# Patient Record
Sex: FEMALE | Race: ASIAN | Employment: UNEMPLOYED | URBAN - METROPOLITAN AREA
[De-identification: names, ages, dates, MRNs, and addresses within clinical notes are randomized per-mention and may not be internally consistent; named-entity substitution may affect disease eponyms.]

---

## 2017-03-07 ENCOUNTER — OFFICE VISIT (OUTPATIENT)
Dept: FAMILY MEDICINE CLINIC | Age: 37
End: 2017-03-07

## 2017-03-07 ENCOUNTER — TELEPHONE (OUTPATIENT)
Dept: FAMILY MEDICINE CLINIC | Age: 37
End: 2017-03-07

## 2017-03-07 VITALS
SYSTOLIC BLOOD PRESSURE: 112 MMHG | DIASTOLIC BLOOD PRESSURE: 77 MMHG | WEIGHT: 121 LBS | HEART RATE: 80 BPM | TEMPERATURE: 98 F | OXYGEN SATURATION: 99 % | RESPIRATION RATE: 18 BRPM | HEIGHT: 64 IN | BODY MASS INDEX: 20.66 KG/M2

## 2017-03-07 DIAGNOSIS — E78.00 ELEVATED CHOLESTEROL: Primary | ICD-10-CM

## 2017-03-07 DIAGNOSIS — Z00.00 ROUTINE GENERAL MEDICAL EXAMINATION AT A HEALTH CARE FACILITY: ICD-10-CM

## 2017-03-07 NOTE — MR AVS SNAPSHOT
Visit Information Date & Time Provider Department Dept. Phone Encounter #  
 3/7/2017  8:00 AM Nikko Pacheco MD [address] [facility] [phone] [id number] Upcoming Health Maintenance Date Due  
 PAP AKA CERVICAL CYTOLOGY 6/1/2019 DTaP/Tdap/Td series (2 - Td) 3/1/2021 Allergies as of 3/7/2017  Review Complete On: 3/7/2017 By: Nikko Pacheco MD  
 No Known Allergies Current Immunizations  Reviewed on 3/7/2017 Name Date TDAP Vaccine 3/1/2011 Reviewed by Nikko Pacheco MD on 3/7/2017 at  8:31 AM  
You Were Diagnosed With   
  
 Codes Comments Elevated cholesterol    -  Primary ICD-10-CM: E78.00 ICD-9-CM: 272.0 Routine general medical examination at a health care facility     ICD-10-CM: Z00.00 ICD-9-CM: V70.0 Vitals BP Pulse Temp Resp Height(growth percentile) Weight(growth percentile) 112/77 (BP 1 Location: Left arm, BP Patient Position: Sitting) 80 98 °F (36.7 °C) (Oral) 18 5' 4\" (1.626 m) 121 lb (54.9 kg) LMP SpO2 BMI OB Status Smoking Status 03/05/2017 (Exact Date) 99% 20.77 kg/m2 Having regular periods Never Smoker Vitals History BMI and BSA Data Body Mass Index Body Surface Area 20.77 kg/m 2 1.57 m 2 Preferred Pharmacy Pharmacy Name Phone [id number] [phone] Your Updated Medication List  
  
   
This list is accurate as of: 3/7/17  8:32 AM.  Always use your most recent med list.  
  
  
  
  
 loratadine 10 mg tablet Commonly known as:  CLARITIN  
TAKE 1 TABLET BY MOUTH EVERY DAY  
  
 multivitamin tablet Commonly known as:  ONE A DAY Take 1 Tab by mouth daily. [address] [facility] (28) 0.25-35 mg-mcg Tab Generic drug:  norgestimate-ethinyl estradiol We Performed the Following CBC WITH AUTOMATED DIFF [03104 CPT(R)] HEMOGLOBIN A1C WITH EAG [26235 CPT(R)] LIPID PANEL [12022 CPT(R)] METABOLIC PANEL, COMPREHENSIVE [17057 CPT(R)] TSH 3RD GENERATION [19654 CPT(R)] VITAMIN D, 25 HYDROXY [id number] CPT(R)] Introducing \[facility] [place] [facility]\"" & HEALTH SERVICES! Spring Peter introduces [employer] patient portal. Now you can access parts of your medical record, email your doctor's office, and request medication refills online. 1. In your internet browser, go to [email or web address] 2. Click on the First Time User? Click Here link in the Sign In box. You will see the New Member Sign Up page. 3. Enter your [employer] Access Code exactly as it appears below. You will not need to use this code after youve completed the sign-up process. If you do not sign up before the expiration date, you must request a new code. · [employer] Access Code: [email or web address] Expires: 6/5/2017  8:32 AM 
 
4. Enter the last four digits of your Social Security Number (xxxx) and Date of Birth (mm/dd/yyyy) as indicated and click Submit. You will be taken to the next sign-up page. 5. Create a [employer] ID. This will be your [employer] login ID and cannot be changed, so think of one that is secure and easy to remember. 6. Create a [employer] password. You can change your password at any time. 7. Enter your Password Reset Question and Answer. This can be used at a later time if you forget your password. 8. Enter your e-mail address. You will receive e-mail notification when new information is available in [address] 9. Click Sign Up. You can now view and download portions of your medical record. 10. Click the Download Summary menu link to download a portable copy of your medical information. If you have questions, please visit the Frequently Asked Questions section of the [employer] website. Remember, [employer] is NOT to be used for urgent needs. For medical emergencies, dial 911. Now available from your iPhone and Android! Please provide this summary of care documentation to your next provider. Your primary care clinician is listed as MIKAL VALENCIA. If you have any questions after today's visit, please call [phone].

## 2017-03-07 NOTE — TELEPHONE ENCOUNTER
DOS 3-7-17     Per general billing guidelines, procedure code 76123 indicates that a preventive service was rendered. However, the primary diagnosis  may not support this type of service.    Thank you

## 2017-03-07 NOTE — PROGRESS NOTES
Pt here for routine fasting lab work. Denies having any concerns at this time. Pt reports that she has been watching what she eats, has lost 8 pounds. Pt is also a runner, when the weather allows. Subjective: (As above and below)   No chief complaint on file. she is a 39y.o. year old female who presents for evaluation. Reviewed PmHx, RxHx, FmHx, SocHx, AllgHx and updated in chart. Review of Systems - negative except as listed above    Objective:     Vitals:    03/07/17 0815   BP: 112/77   Pulse: 80   Resp: 18   Temp: 98 °F (36.7 °C)   TempSrc: Oral   SpO2: 99%   Weight: 121 lb (54.9 kg)   Height: 5' 4\" (1.626 m)     Physical Examination: General appearance - alert, well appearing, and in no distress  Mental status - normal mood, behavior, speech, dress, motor activity, and thought processes  Eyes - pupils equal and reactive, extraocular eye movements intact  Mouth - mucous membranes moist, pharynx normal without lesions  Chest - clear to auscultation, no wheezes, rales or rhonchi, symmetric air entry  Heart - normal rate, regular rhythm, normal S1, S2, no murmurs, rubs, clicks or gallops  Musculoskeletal - no joint tenderness, deformity or swelling  Extremities - peripheral pulses normal, no pedal edema, no clubbing or cyanosis    Assessment/ Plan:   1. Elevated cholesterol  -check fasting labs  - CBC WITH AUTOMATED DIFF  - LIPID PANEL  - METABOLIC PANEL, COMPREHENSIVE  - VITAMIN D, 25 HYDROXY  - TSH 3RD GENERATION  - HEMOGLOBIN A1C WITH EAG    2. Routine general medical examination at a health care facility  -doing well, continue regular exercise     Follow-up Disposition: As needed  I have discussed the diagnosis with the patient and the intended plan as seen in the above orders. The patient has received an after-visit summary and questions were answered concerning future plans.      Medication Side Effects and Warnings were discussed with patient: yes  Patient Labs were reviewed: yes  Patient Past Records were reviewed:  yes    Rosa Fletcher M.D.

## 2017-03-08 LAB
25(OH)D3+25(OH)D2 SERPL-MCNC: 36 NG/ML (ref 30–100)
ALBUMIN SERPL-MCNC: 4.9 G/DL (ref 3.5–5.5)
ALBUMIN/GLOB SERPL: 2.1 {RATIO} (ref 1.1–2.5)
ALP SERPL-CCNC: 50 IU/L (ref 39–117)
ALT SERPL-CCNC: 13 IU/L (ref 0–32)
AST SERPL-CCNC: 12 IU/L (ref 0–40)
BASOPHILS # BLD AUTO: 0 X10E3/UL (ref 0–0.2)
BASOPHILS NFR BLD AUTO: 0 %
BILIRUB SERPL-MCNC: 0.5 MG/DL (ref 0–1.2)
BUN SERPL-MCNC: 16 MG/DL (ref 6–20)
BUN/CREAT SERPL: 28 (ref 8–20)
CALCIUM SERPL-MCNC: 9.5 MG/DL (ref 8.7–10.2)
CHLORIDE SERPL-SCNC: 102 MMOL/L (ref 96–106)
CHOLEST SERPL-MCNC: 176 MG/DL (ref 100–199)
CO2 SERPL-SCNC: 23 MMOL/L (ref 18–29)
CREAT SERPL-MCNC: 0.58 MG/DL (ref 0.57–1)
EOSINOPHIL # BLD AUTO: 0.1 X10E3/UL (ref 0–0.4)
EOSINOPHIL NFR BLD AUTO: 2 %
ERYTHROCYTE [DISTWIDTH] IN BLOOD BY AUTOMATED COUNT: 13.8 % (ref 12.3–15.4)
EST. AVERAGE GLUCOSE BLD GHB EST-MCNC: 123 MG/DL
GLOBULIN SER CALC-MCNC: 2.3 G/DL (ref 1.5–4.5)
GLUCOSE SERPL-MCNC: 94 MG/DL (ref 65–99)
HBA1C MFR BLD: 5.9 % (ref 4.8–5.6)
HCT VFR BLD AUTO: 38.4 % (ref 34–46.6)
HDLC SERPL-MCNC: 60 MG/DL
HGB BLD-MCNC: 12.8 G/DL (ref 11.1–15.9)
IMM GRANULOCYTES # BLD: 0 X10E3/UL (ref 0–0.1)
IMM GRANULOCYTES NFR BLD: 0 %
INTERPRETATION, 910389: NORMAL
LDLC SERPL CALC-MCNC: 99 MG/DL (ref 0–99)
LYMPHOCYTES # BLD AUTO: 1.8 X10E3/UL (ref 0.7–3.1)
LYMPHOCYTES NFR BLD AUTO: 39 %
MCH RBC QN AUTO: 29.2 PG (ref 26.6–33)
MCHC RBC AUTO-ENTMCNC: 33.3 G/DL (ref 31.5–35.7)
MCV RBC AUTO: 88 FL (ref 79–97)
MONOCYTES # BLD AUTO: 0.3 X10E3/UL (ref 0.1–0.9)
MONOCYTES NFR BLD AUTO: 7 %
NEUTROPHILS # BLD AUTO: 2.5 X10E3/UL (ref 1.4–7)
NEUTROPHILS NFR BLD AUTO: 52 %
PLATELET # BLD AUTO: 220 X10E3/UL (ref 150–379)
POTASSIUM SERPL-SCNC: 4.7 MMOL/L (ref 3.5–5.2)
PROT SERPL-MCNC: 7.2 G/DL (ref 6–8.5)
RBC # BLD AUTO: 4.38 X10E6/UL (ref 3.77–5.28)
SODIUM SERPL-SCNC: 143 MMOL/L (ref 134–144)
TRIGL SERPL-MCNC: 85 MG/DL (ref 0–149)
TSH SERPL DL<=0.005 MIU/L-ACNC: 1.48 UIU/ML (ref 0.45–4.5)
VLDLC SERPL CALC-MCNC: 17 MG/DL (ref 5–40)
WBC # BLD AUTO: 4.7 X10E3/UL (ref 3.4–10.8)

## 2017-03-08 NOTE — PROGRESS NOTES
Increase in risk for diabetes, please closely monitor diet and increase exercise   All other labs are within normal limits.    Please inform

## 2017-05-30 RX ORDER — LORATADINE 10 MG/1
TABLET ORAL
Qty: 30 TAB | Refills: 3 | Status: SHIPPED | OUTPATIENT
Start: 2017-05-30 | End: 2019-08-20 | Stop reason: ALTCHOICE

## 2018-11-13 ENCOUNTER — OFFICE VISIT (OUTPATIENT)
Dept: FAMILY MEDICINE CLINIC | Age: 38
End: 2018-11-13

## 2018-11-13 VITALS
HEIGHT: 64 IN | TEMPERATURE: 98.1 F | BODY MASS INDEX: 20.38 KG/M2 | SYSTOLIC BLOOD PRESSURE: 101 MMHG | OXYGEN SATURATION: 98 % | HEART RATE: 86 BPM | DIASTOLIC BLOOD PRESSURE: 71 MMHG | WEIGHT: 119.4 LBS | RESPIRATION RATE: 17 BRPM

## 2018-11-13 DIAGNOSIS — E78.00 ELEVATED CHOLESTEROL: ICD-10-CM

## 2018-11-13 DIAGNOSIS — Z11.1 SCREENING-PULMONARY TB: Primary | ICD-10-CM

## 2018-11-13 DIAGNOSIS — Z00.00 ROUTINE GENERAL MEDICAL EXAMINATION AT A HEALTH CARE FACILITY: ICD-10-CM

## 2018-11-13 NOTE — PROGRESS NOTES
Chief Complaint   Patient presents with    Physical    Labs     Pt is fasting     Pt seen in the office today for a physical with fasting labs     Pt reports that she has been having night sweats, several nights a week. Pt is on birth control pills, reports that her periods are lighter. Pt reports occasional episodes of vertigo in her sleep, pt reports that she does not have symptoms while she is up and moving. Pt also reports some mood swings and low sex drive. Pt reports that she is under a lot of stress, 3 children. Subjective:   40 y.o. female for Well Woman Check. Her gyne and breast care is done elsewhere by her Ob-Gyne physician. Patient Active Problem List   Diagnosis Code    Sciatica M54.30    Elevated cholesterol E78.00     Current Outpatient Medications   Medication Sig Dispense Refill    SPRINTEC, 28, 0.25-35 mg-mcg per tablet       multivitamin (ONE A DAY) tablet Take 1 Tab by mouth daily.  loratadine (CLARITIN) 10 mg tablet TAKE 1 TABLET BY MOUTH EVERY DAY 30 Tab 3     No Known Allergies     Lab Results   Component Value Date/Time    Cholesterol, total 176 03/07/2017 08:38 AM    HDL Cholesterol 60 03/07/2017 08:38 AM    LDL, calculated 99 03/07/2017 08:38 AM    Triglyceride 85 03/07/2017 08:38 AM        ROS: Feeling generally well. No TIA's or unusual headaches, no dysphagia. No prolonged cough. No dyspnea or chest pain on exertion. No abdominal pain, change in bowel habits, black or bloody stools. No urinary tract symptoms. No new or unusual musculoskeletal symptoms. Specific concerns today: See above. Objective: The patient appears well, alert, oriented x 3, in no distress. Visit Vitals  /71 (BP 1 Location: Left arm, BP Patient Position: Sitting)   Pulse 86   Temp 98.1 °F (36.7 °C) (Oral)   Resp 17   Ht 5' 4\" (1.626 m)   Wt 119 lb 6.4 oz (54.2 kg)   LMP 11/12/2018   SpO2 98%   BMI 20.49 kg/m²     ENT normal.  Neck supple.  No adenopathy or thyromegaly. NIRAV. Lungs are clear, good air entry, no wheezes, rhonchi or rales. S1 and S2 normal, no murmurs, regular rate and rhythm. Abdomen soft without tenderness, guarding, mass or organomegaly. Extremities show no edema, normal peripheral pulses. Neurological is normal, no focal findings. Breast and Pelvic exams are deferred. Assessment/Plan:   Well Woman  routine labs ordered, call if any problems    ICD-10-CM ICD-9-CM    1. Screening-pulmonary TB Z11.1 V74.1 QUANTIFERON-TB GOLD PLUS   2. Routine general medical examination at a health care facility Z00.00 V70.0 CBC WITH AUTOMATED DIFF      HEMOGLOBIN A1C WITH EAG      TSH 3RD GENERATION      VITAMIN D, 25 HYDROXY   3. Elevated cholesterol C98.06 735.6 METABOLIC PANEL, COMPREHENSIVE      LIPID PANEL     Encounter Diagnoses   Name Primary?     Screening-pulmonary TB Yes    Routine general medical examination at a health care facility     Elevated cholesterol      Orders Placed This Encounter    QUANTIFERON-TB GOLD PLUS    METABOLIC PANEL, COMPREHENSIVE    CBC WITH AUTOMATED DIFF    LIPID PANEL    HEMOGLOBIN A1C WITH EAG    TSH 3RD GENERATION    VITAMIN D, 25 HYDROXY

## 2018-11-14 LAB
25(OH)D3+25(OH)D2 SERPL-MCNC: 35 NG/ML (ref 30–100)
ALBUMIN SERPL-MCNC: 4.6 G/DL (ref 3.5–5.5)
ALBUMIN/GLOB SERPL: 1.8 {RATIO} (ref 1.2–2.2)
ALP SERPL-CCNC: 53 IU/L (ref 39–117)
ALT SERPL-CCNC: 11 IU/L (ref 0–32)
AST SERPL-CCNC: 14 IU/L (ref 0–40)
BASOPHILS # BLD AUTO: 0 X10E3/UL (ref 0–0.2)
BASOPHILS NFR BLD AUTO: 1 %
BILIRUB SERPL-MCNC: 0.5 MG/DL (ref 0–1.2)
BUN SERPL-MCNC: 16 MG/DL (ref 6–20)
BUN/CREAT SERPL: 24 (ref 9–23)
CALCIUM SERPL-MCNC: 9.7 MG/DL (ref 8.7–10.2)
CHLORIDE SERPL-SCNC: 101 MMOL/L (ref 96–106)
CHOLEST SERPL-MCNC: 178 MG/DL (ref 100–199)
CO2 SERPL-SCNC: 24 MMOL/L (ref 20–29)
CREAT SERPL-MCNC: 0.66 MG/DL (ref 0.57–1)
EOSINOPHIL # BLD AUTO: 0.1 X10E3/UL (ref 0–0.4)
EOSINOPHIL NFR BLD AUTO: 2 %
ERYTHROCYTE [DISTWIDTH] IN BLOOD BY AUTOMATED COUNT: 13.5 % (ref 12.3–15.4)
EST. AVERAGE GLUCOSE BLD GHB EST-MCNC: 117 MG/DL
GLOBULIN SER CALC-MCNC: 2.6 G/DL (ref 1.5–4.5)
GLUCOSE SERPL-MCNC: 89 MG/DL (ref 65–99)
HBA1C MFR BLD: 5.7 % (ref 4.8–5.6)
HCT VFR BLD AUTO: 43 % (ref 34–46.6)
HDLC SERPL-MCNC: 59 MG/DL
HGB BLD-MCNC: 13.8 G/DL (ref 11.1–15.9)
IMM GRANULOCYTES # BLD: 0 X10E3/UL (ref 0–0.1)
IMM GRANULOCYTES NFR BLD: 0 %
INTERPRETATION, 910389: NORMAL
LDLC SERPL CALC-MCNC: 97 MG/DL (ref 0–99)
LYMPHOCYTES # BLD AUTO: 1.8 X10E3/UL (ref 0.7–3.1)
LYMPHOCYTES NFR BLD AUTO: 47 %
MCH RBC QN AUTO: 28.8 PG (ref 26.6–33)
MCHC RBC AUTO-ENTMCNC: 32.1 G/DL (ref 31.5–35.7)
MCV RBC AUTO: 90 FL (ref 79–97)
MONOCYTES # BLD AUTO: 0.2 X10E3/UL (ref 0.1–0.9)
MONOCYTES NFR BLD AUTO: 6 %
NEUTROPHILS # BLD AUTO: 1.6 X10E3/UL (ref 1.4–7)
NEUTROPHILS NFR BLD AUTO: 44 %
PLATELET # BLD AUTO: 228 X10E3/UL (ref 150–379)
POTASSIUM SERPL-SCNC: 4.7 MMOL/L (ref 3.5–5.2)
PROT SERPL-MCNC: 7.2 G/DL (ref 6–8.5)
RBC # BLD AUTO: 4.8 X10E6/UL (ref 3.77–5.28)
SODIUM SERPL-SCNC: 140 MMOL/L (ref 134–144)
TRIGL SERPL-MCNC: 111 MG/DL (ref 0–149)
TSH SERPL DL<=0.005 MIU/L-ACNC: 1.34 UIU/ML (ref 0.45–4.5)
VLDLC SERPL CALC-MCNC: 22 MG/DL (ref 5–40)
WBC # BLD AUTO: 3.7 X10E3/UL (ref 3.4–10.8)

## 2018-11-14 NOTE — PROGRESS NOTES
Blood sugars have improved, remain very slightly elevated. All other labs are within normal limits. Please inform.

## 2018-11-16 LAB
GAMMA INTERFERON BACKGROUND BLD IA-ACNC: 0.02 IU/ML
M TB IFN-G BLD-IMP: NEGATIVE
M TB IFN-G CD4+ BCKGRND COR BLD-ACNC: 0.12 IU/ML
MITOGEN IGNF BLD-ACNC: >10 IU/ML
QUANTIFERON INCUBATION, QF1T: NORMAL
QUANTIFERON TB2 AG: 0.14 IU/ML
SERVICE CMNT-IMP: NORMAL

## 2019-08-20 ENCOUNTER — OFFICE VISIT (OUTPATIENT)
Dept: FAMILY MEDICINE CLINIC | Age: 39
End: 2019-08-20

## 2019-08-20 VITALS
RESPIRATION RATE: 14 BRPM | BODY MASS INDEX: 20.79 KG/M2 | HEIGHT: 64 IN | TEMPERATURE: 98.1 F | OXYGEN SATURATION: 98 % | WEIGHT: 121.8 LBS | SYSTOLIC BLOOD PRESSURE: 108 MMHG | HEART RATE: 64 BPM | DIASTOLIC BLOOD PRESSURE: 74 MMHG

## 2019-08-20 DIAGNOSIS — M54.32 BILATERAL SCIATICA: ICD-10-CM

## 2019-08-20 DIAGNOSIS — M54.31 BILATERAL SCIATICA: ICD-10-CM

## 2019-08-20 DIAGNOSIS — F41.9 ANXIETY: Primary | ICD-10-CM

## 2019-08-20 RX ORDER — DULOXETIN HYDROCHLORIDE 30 MG/1
30 CAPSULE, DELAYED RELEASE ORAL DAILY
Qty: 30 CAP | Refills: 5 | Status: SHIPPED | OUTPATIENT
Start: 2019-08-20 | End: 2019-09-10 | Stop reason: ALTCHOICE

## 2019-08-20 NOTE — PROGRESS NOTES
HISTORY OF PRESENT ILLNESS  Vela Pallas is a 45 y.o. female. HPI  Patient has been to ER twice with complaints of cp/palps and headache  CT head normal, report scanned for chart  Admits that she is stressed out and having anxiety  Sleeping a lot  Has not been on meds for anxiety in years    Having isaiah sciatica pain, chronic for years  Wants new TENS unit    ROS  A comprehensive review of system was obtained and negative except findings in the HPI    Visit Vitals  /74 (BP 1 Location: Left arm, BP Patient Position: Sitting)   Pulse 64   Temp 98.1 °F (36.7 °C) (Oral)   Resp 14   Ht 5' 4\" (1.626 m)   Wt 121 lb 12.8 oz (55.2 kg)   LMP 08/02/2019 (Exact Date)   SpO2 98%   BMI 20.91 kg/m²       Physical Exam   Constitutional: She is oriented to person, place, and time. She appears well-developed and well-nourished. Neck: No JVD present. Cardiovascular: Normal rate, regular rhythm and intact distal pulses. Exam reveals no gallop and no friction rub. No murmur heard. Pulmonary/Chest: Effort normal and breath sounds normal. No respiratory distress. She has no wheezes. Musculoskeletal: She exhibits no edema. Neurological: She is alert and oriented to person, place, and time. Skin: Skin is warm. Nursing note and vitals reviewed. ASSESSMENT and PLAN  Encounter Diagnoses   Name Primary?  Anxiety Yes    Bilateral sciatica      Orders Placed This Encounter    DULoxetine (CYMBALTA) 30 mg capsule     Will see if ortho can do a TENS unit for her  Start cymbalta 30mg a day  Reviewed the physical signs of stress   Adr/se reviewed and recheck 3 weeks    I have discussed the diagnosis with the patient and the intended plan as seen in the above orders. The patient has received an after-visit summary and questions were answered concerning future plans. Patient conveyed understanding of the plan at the time of the visit.     Edmund Mathis, MSN, ANP  8/20/2019

## 2019-08-20 NOTE — PROGRESS NOTES
Milagro Mcdermott is a 45 y.o. female    Chief Complaint   Patient presents with    Fatigue    Dizziness    Anxiety    Memory Loss     1. Have you been to the ER, urgent care clinic since your last visit? Hospitalized since your last visit? Patient was seen in the [address] [place] [address] ED headaches on 6/26/19 and for palpitations on 6/21/19. 2. Have you seen or consulted any other health care providers outside of the [address] [place] [address] since your last visit? Include any pap smears or colon screening.      Visit Vitals  /74 (BP 1 Location: Left arm, BP Patient Position: Sitting)   Pulse 64   Temp 98.1 °F (36.7 °C) (Oral)   Resp 14   Ht 5' 4\" (1.626 m)   Wt 121 lb 12.8 oz (55.2 kg)   SpO2 98%   BMI 20.91 kg/m²

## 2019-09-10 ENCOUNTER — OFFICE VISIT (OUTPATIENT)
Dept: FAMILY MEDICINE CLINIC | Age: 39
End: 2019-09-10

## 2019-09-10 VITALS
BODY MASS INDEX: 20.66 KG/M2 | HEIGHT: 64 IN | TEMPERATURE: 98.7 F | HEART RATE: 81 BPM | RESPIRATION RATE: 20 BRPM | OXYGEN SATURATION: 98 % | SYSTOLIC BLOOD PRESSURE: 130 MMHG | DIASTOLIC BLOOD PRESSURE: 81 MMHG | WEIGHT: 121 LBS

## 2019-09-10 DIAGNOSIS — F32.A DEPRESSION, UNSPECIFIED DEPRESSION TYPE: Primary | ICD-10-CM

## 2019-09-10 DIAGNOSIS — F41.9 ANXIETY: ICD-10-CM

## 2019-09-10 RX ORDER — TRAZODONE HYDROCHLORIDE 100 MG/1
100 TABLET ORAL
Qty: 30 TAB | Refills: 2 | Status: SHIPPED | OUTPATIENT
Start: 2019-09-10 | End: 2020-02-05 | Stop reason: ALTCHOICE

## 2019-09-10 RX ORDER — SERTRALINE HYDROCHLORIDE 50 MG/1
50 TABLET, FILM COATED ORAL DAILY
Qty: 30 TAB | Refills: 5 | Status: SHIPPED | OUTPATIENT
Start: 2019-09-10 | End: 2020-02-05 | Stop reason: ALTCHOICE

## 2019-09-10 RX ORDER — CLOTRIMAZOLE AND BETAMETHASONE DIPROPIONATE 10; .64 MG/G; MG/G
CREAM TOPICAL
Qty: 15 G | Refills: 1 | Status: SHIPPED | OUTPATIENT
Start: 2019-09-10 | End: 2020-01-28

## 2019-09-10 NOTE — PROGRESS NOTES
HISTORY OF PRESENT ILLNESS  Beltran Guerrero is a 45 y.o. female. HPI  Pt in office today for anxiety depression  -pt was given Cymbalta but is not taking it because it made her feel \"unlike myself\"  -pt states it also makes her drowsy  Not sleeping because her mind races  A lot of family stress, father in the hospital    ROS  A comprehensive review of system was obtained and negative except findings in the HPI    Visit Vitals  /81 (BP 1 Location: Right arm, BP Patient Position: Sitting)   Pulse 81   Temp 98.7 °F (37.1 °C) (Oral)   Resp 20   Ht 5' 4\" (1.626 m)   Wt 121 lb (54.9 kg)   LMP 08/25/2019   SpO2 98%   BMI 20.77 kg/m²     Physical Exam   Constitutional: She is oriented to person, place, and time. She appears well-developed and well-nourished. Neck: No JVD present. Cardiovascular: Normal rate, regular rhythm and intact distal pulses. Exam reveals no gallop and no friction rub. No murmur heard. Pulmonary/Chest: Effort normal and breath sounds normal. No respiratory distress. She has no wheezes. Musculoskeletal: She exhibits no edema. Neurological: She is alert and oriented to person, place, and time. Skin: Skin is warm. Nursing note and vitals reviewed. ASSESSMENT and PLAN  Encounter Diagnoses   Name Primary?  Depression, unspecified depression type Yes    Anxiety      Orders Placed This Encounter    sertraline (ZOLOFT) 50 mg tablet    traZODone (DESYREL) 100 mg tablet    clotrimazole-betamethasone (LOTRISONE) topical cream     Change to zoloft 50mg a day  Take at night in case it makes her fatigued  Add trazodone at night prn sleep  Follow up 3 weeks  Referral to Marito Pagan for counseling    I have discussed the diagnosis with the patient and the intended plan as seen in the above orders. The patient has received an after-visit summary and questions were answered concerning future plans. Patient conveyed understanding of the plan at the time of the visit.     Luh GOLDSMITH Galindo Pereira, MSN, ANP  9/10/2019

## 2019-09-10 NOTE — PROGRESS NOTES
Chief Complaint   Patient presents with    Anxiety    Depression     Pt in office today for anxiety depression  -pt was given Cymbalta but is not taking it because it made her feel \"unlike myself\"  -pt states it also makes her drowsy  1. Have you been to the ER, urgent care clinic since your last visit? Hospitalized since your last visit? No    2. Have you seen or consulted any other health care providers outside of the [address] [place] [address] since your last visit? Include any pap smears or colon screening.  obgyn    Pt has no other concerns

## 2020-01-28 RX ORDER — CLOTRIMAZOLE AND BETAMETHASONE DIPROPIONATE 10; .64 MG/G; MG/G
CREAM TOPICAL
Qty: 15 G | Refills: 1 | Status: SHIPPED | OUTPATIENT
Start: 2020-01-28 | End: 2020-09-23 | Stop reason: SDUPTHER

## 2020-02-05 ENCOUNTER — OFFICE VISIT (OUTPATIENT)
Dept: FAMILY MEDICINE CLINIC | Age: 40
End: 2020-02-05

## 2020-02-05 VITALS
WEIGHT: 125 LBS | BODY MASS INDEX: 21.34 KG/M2 | OXYGEN SATURATION: 93 % | SYSTOLIC BLOOD PRESSURE: 91 MMHG | DIASTOLIC BLOOD PRESSURE: 64 MMHG | HEART RATE: 87 BPM | TEMPERATURE: 98.5 F | HEIGHT: 64 IN | RESPIRATION RATE: 16 BRPM

## 2020-02-05 DIAGNOSIS — F41.9 ANXIETY: ICD-10-CM

## 2020-02-05 DIAGNOSIS — E78.00 ELEVATED CHOLESTEROL: ICD-10-CM

## 2020-02-05 DIAGNOSIS — Z00.00 ROUTINE GENERAL MEDICAL EXAMINATION AT A HEALTH CARE FACILITY: Primary | ICD-10-CM

## 2020-02-05 NOTE — PROGRESS NOTES
Chief Complaint   Patient presents with    Labs    Anxiety     Pt in office today for labs  -pt has concerns about anxiety  -pt states she stopped her anxiety meds and that she has been feeling tired and light headed    1. Have you been to the ER, urgent care clinic since your last visit? Hospitalized since your last visit? No    2. Have you seen or consulted any other health care providers outside of the [address] [place] [address] since your last visit? Include any pap smears or colon screening.  No       Pt has no other concerns

## 2020-02-06 LAB
ALBUMIN SERPL-MCNC: 4.5 G/DL (ref 3.8–4.8)
ALBUMIN/GLOB SERPL: 1.9 {RATIO} (ref 1.2–2.2)
ALP SERPL-CCNC: 48 IU/L (ref 39–117)
ALT SERPL-CCNC: 18 IU/L (ref 0–32)
AST SERPL-CCNC: 19 IU/L (ref 0–40)
BASOPHILS # BLD AUTO: 0.1 X10E3/UL (ref 0–0.2)
BASOPHILS NFR BLD AUTO: 1 %
BILIRUB SERPL-MCNC: 0.5 MG/DL (ref 0–1.2)
BUN SERPL-MCNC: 15 MG/DL (ref 6–20)
BUN/CREAT SERPL: 25 (ref 9–23)
CALCIUM SERPL-MCNC: 9 MG/DL (ref 8.7–10.2)
CHLORIDE SERPL-SCNC: 104 MMOL/L (ref 96–106)
CHOLEST SERPL-MCNC: 166 MG/DL (ref 100–199)
CO2 SERPL-SCNC: 21 MMOL/L (ref 20–29)
CREAT SERPL-MCNC: 0.61 MG/DL (ref 0.57–1)
EOSINOPHIL # BLD AUTO: 0.1 X10E3/UL (ref 0–0.4)
EOSINOPHIL NFR BLD AUTO: 2 %
ERYTHROCYTE [DISTWIDTH] IN BLOOD BY AUTOMATED COUNT: 12.3 % (ref 11.7–15.4)
GLOBULIN SER CALC-MCNC: 2.4 G/DL (ref 1.5–4.5)
GLUCOSE SERPL-MCNC: 101 MG/DL (ref 65–99)
HCT VFR BLD AUTO: 38.2 % (ref 34–46.6)
HDLC SERPL-MCNC: 62 MG/DL
HGB BLD-MCNC: 12.9 G/DL (ref 11.1–15.9)
IMM GRANULOCYTES # BLD AUTO: 0 X10E3/UL (ref 0–0.1)
IMM GRANULOCYTES NFR BLD AUTO: 0 %
INTERPRETATION, 910389: NORMAL
LDLC SERPL CALC-MCNC: 93 MG/DL (ref 0–99)
LYMPHOCYTES # BLD AUTO: 1.7 X10E3/UL (ref 0.7–3.1)
LYMPHOCYTES NFR BLD AUTO: 39 %
MCH RBC QN AUTO: 29.4 PG (ref 26.6–33)
MCHC RBC AUTO-ENTMCNC: 33.8 G/DL (ref 31.5–35.7)
MCV RBC AUTO: 87 FL (ref 79–97)
MONOCYTES # BLD AUTO: 0.3 X10E3/UL (ref 0.1–0.9)
MONOCYTES NFR BLD AUTO: 7 %
NEUTROPHILS # BLD AUTO: 2.1 X10E3/UL (ref 1.4–7)
NEUTROPHILS NFR BLD AUTO: 51 %
PLATELET # BLD AUTO: 190 X10E3/UL (ref 150–450)
POTASSIUM SERPL-SCNC: 4.3 MMOL/L (ref 3.5–5.2)
PROT SERPL-MCNC: 6.9 G/DL (ref 6–8.5)
RBC # BLD AUTO: 4.39 X10E6/UL (ref 3.77–5.28)
SODIUM SERPL-SCNC: 142 MMOL/L (ref 134–144)
TRIGL SERPL-MCNC: 54 MG/DL (ref 0–149)
TSH SERPL DL<=0.005 MIU/L-ACNC: 1.23 UIU/ML (ref 0.45–4.5)
VLDLC SERPL CALC-MCNC: 11 MG/DL (ref 5–40)
WBC # BLD AUTO: 4.3 X10E3/UL (ref 3.4–10.8)

## 2020-02-08 NOTE — PROGRESS NOTES
Subjective:   44 y.o. female for Well Woman Check. Her gyne and breast care is done elsewhere by her Ob-Gyne physician. Patient Active Problem List    Diagnosis Date Noted    Sciatica 01/14/2014    Elevated cholesterol 01/14/2014     Current Outpatient Medications   Medication Sig Dispense Refill    COLLAGEN by Does Not Apply route.  multivitamin, tx-iron-ca-min (THERA-M W/ IRON) 9 mg iron-400 mcg tab tablet Take 1 Tab by mouth daily.  OTHER tumeric      clotrimazole-betamethasone (LOTRISONE) topical cream APPLY TWICE DAILY AS NEEDED FOR RASH 15 g 1    multivitamin (ONE A DAY) tablet Take 1 Tab by mouth daily. Family History   Problem Relation Age of Onset    Diabetes Mother     Cancer Mother 58        Colon    Hypertension Father     Stroke Father         x's 2     Social History     Tobacco Use    Smoking status: Never Smoker    Smokeless tobacco: Never Used   Substance Use Topics    Alcohol use: No         ROS: Feeling generally well. No TIA's or unusual headaches, no dysphagia. No prolonged cough. No dyspnea or chest pain on exertion. No abdominal pain, change in bowel habits, black or bloody stools. No urinary tract symptoms. No new or unusual musculoskeletal symptoms. Specific concerns today:  pt has concerns about anxiety  pt states she stopped her anxiety meds and that she has been feeling tired and light headed      Objective: The patient appears well, alert, oriented x 3, in no distress. Visit Vitals  BP 91/64 (BP 1 Location: Left arm, BP Patient Position: Sitting)   Pulse 87   Temp 98.5 °F (36.9 °C) (Oral)   Resp 16   Ht 5' 4\" (1.626 m)   Wt 125 lb (56.7 kg)   LMP 01/24/2020   SpO2 93%   BMI 21.46 kg/m²     ENT normal.  Neck supple. No adenopathy or thyromegaly. NIRAV. Lungs are clear, good air entry, no wheezes, rhonchi or rales. S1 and S2 normal, no murmurs, regular rate and rhythm. Abdomen soft without tenderness, guarding, mass or organomegaly.  Extremities show no edema, normal peripheral pulses. Neurological is normal, no focal findings. Breast and Pelvic exams are deferred. Assessment/Plan:   Well Woman  increase physical activity, follow low fat diet, follow low salt diet, routine labs ordered  Encounter Diagnoses   Name Primary?  Routine general medical examination at a health care facility Yes    Elevated cholesterol     Anxiety      Orders Placed This Encounter    LIPID PANEL    CBC WITH AUTOMATED DIFF    METABOLIC PANEL, COMPREHENSIVE    TSH 3RD GENERATION    CVD REPORT    COLLAGEN    multivitamin, tx-iron-ca-min (THERA-M W/ IRON) 9 mg iron-400 mcg tab tablet    OTHER     She does not wish to restart her anxiety med  Has started yoga and wants to natural ways first  Advised to follow up if needs to restart med    I have discussed the diagnosis with the patient and the intended plan as seen in the above orders. The patient has received an after-visit summary and questions were answered concerning future plans. Patient conveyed understanding of the plan at the time of the visit.     Karthik Galvez, SOCRATES, ANP  2/8/2020

## 2020-09-23 ENCOUNTER — VIRTUAL VISIT (OUTPATIENT)
Dept: FAMILY MEDICINE CLINIC | Age: 40
End: 2020-09-23
Payer: MEDICAID

## 2020-09-23 DIAGNOSIS — G89.29 CHRONIC BILATERAL LOW BACK PAIN WITH RIGHT-SIDED SCIATICA: ICD-10-CM

## 2020-09-23 DIAGNOSIS — M54.41 CHRONIC BILATERAL LOW BACK PAIN WITH RIGHT-SIDED SCIATICA: ICD-10-CM

## 2020-09-23 DIAGNOSIS — M54.2 NECK PAIN: ICD-10-CM

## 2020-09-23 DIAGNOSIS — F41.9 ANXIETY: ICD-10-CM

## 2020-09-23 DIAGNOSIS — L30.4 INTERTRIGO: Primary | ICD-10-CM

## 2020-09-23 DIAGNOSIS — L70.0 ACNE VULGARIS: ICD-10-CM

## 2020-09-23 PROCEDURE — 99214 OFFICE O/P EST MOD 30 MIN: CPT | Performed by: NURSE PRACTITIONER

## 2020-09-23 RX ORDER — TRETINOIN 0.5 MG/G
CREAM TOPICAL
Qty: 45 G | Refills: 1 | Status: SHIPPED | OUTPATIENT
Start: 2020-09-23 | End: 2021-09-14

## 2020-09-23 RX ORDER — CLOTRIMAZOLE AND BETAMETHASONE DIPROPIONATE 10; .64 MG/G; MG/G
CREAM TOPICAL
Qty: 45 G | Refills: 1 | Status: SHIPPED | OUTPATIENT
Start: 2020-09-23 | End: 2021-09-14

## 2020-09-23 RX ORDER — DICLOFENAC SODIUM 10 MG/G
2 GEL TOPICAL 4 TIMES DAILY
Qty: 200 G | Refills: 2 | Status: SHIPPED | OUTPATIENT
Start: 2020-09-23 | End: 2021-09-14 | Stop reason: SDUPTHER

## 2020-09-23 NOTE — PROGRESS NOTES
Oliver Roman is a 44 y.o. female who was seen by synchronous (real-time) audio-video technology on 9/23/2020 for Acne; Medication Refill; and Neck Pain (Back of the neck pain )        Assessment & Plan:   Diagnoses and all orders for this visit:    1. Intertrigo  Add rx prn  Moisture-wicking bra, change out of wet garments promptly after exercise or sweating  -     clotrimazole-betamethasone (LOTRISONE) topical cream; Apply  to affected area two (2) times daily as needed for Skin Irritation. 2. Acne vulgaris  Add rx  F/u with dermatology as planned  -     tretinoin (RETIN-A) 0.05 % topical cream; Apply  to affected area nightly. 3. Neck pain  Add rx  Heat prn  stretching  -     diclofenac (VOLTAREN) 1 % gel; Apply 2 g to affected area four (4) times daily. 4. Chronic bilateral low back pain with right-sided sciatica  Add rx  Heat prn  Stretching  Consider chiropractic care- patient prefers to wait until covid exposure risk decreases  -     diclofenac (VOLTAREN) 1 % gel; Apply 2 g to affected area four (4) times daily. 5. Anxiety  Discussed medication options- patient declines medication at present time  Consider counseling      Follow-up and Dispositions    · Return in about 6 weeks (around 11/4/2020) for acne. I have discussed the diagnosis with the patient and the intended plan as seen in the above orders, and questions were answered concerning future plans. Patient conveyed understanding of the plan at the time of the visit. 712  Subjective:     HPI:    Presents for evaluation of acne, neck pain, headache, anxiety, and low back pain. C/o increased acne breakouts on chin, nose, and forehead for the past 6 months. Using ance face wash BID. Discussed symptoms with her OB, states she was advised to discuss with PCP. She has made an appt with her dermatologist, first available not until January.      C/o increased anxiety over the past few months due to the pandemic, has been very fearful of going out for fear of exposure. Notes increased tension and pain in posterior neck, upper back. Also notes more frequent headaches with increased anxiety. Has tried multiple SSRI and also duloxetine; did not continue due to severe drowsiness. Not interested in trying any other medications. C/o flare of right low back pain with sciatica right leg. No weakness, numbness and tingling. Patient reports she has been advised in the past that right leg is shorter than left. PT has helped in the past.     C/o rash, red with itching under and between breasts. Has responded to treatment with lotrisone cream in the past, would like refill. Prior to Admission medications    Medication Sig Start Date End Date Taking? Authorizing Provider   clotrimazole-betamethasone (LOTRISONE) topical cream Apply  to affected area two (2) times daily as needed for Skin Irritation. 9/23/20  Yes Brandon Ospina, NP   tretinoin (RETIN-A) 0.05 % topical cream Apply  to affected area nightly. 9/23/20  Yes Brandon Ospina NP   diclofenac (VOLTAREN) 1 % gel Apply 2 g to affected area four (4) times daily. 9/23/20  Yes Nadia Ospina, NP   COLLAGEN by Does Not Apply route. Yes Provider, Historical   multivitamin, tx-iron-ca-min (THERA-M W/ IRON) 9 mg iron-400 mcg tab tablet Take 1 Tab by mouth daily. Yes Provider, Historical   OTHER tumeric   Yes Provider, Historical   multivitamin (ONE A DAY) tablet Take 1 Tab by mouth daily. Yes Provider, Historical     Patient Active Problem List   Diagnosis Code    Sciatica M54.30    Elevated cholesterol E78.00     No Known Allergies  History reviewed. No pertinent past medical history. History reviewed. No pertinent surgical history.   Family History   Problem Relation Age of Onset    Diabetes Mother     Cancer Mother 58        Colon    Hypertension Father     Stroke Father         x's 2     Social History     Tobacco Use    Smoking status: Never Smoker    Smokeless tobacco: Never Used Substance Use Topics    Alcohol use: No       Review of Systems   Constitutional: Negative for chills, fever, malaise/fatigue and weight loss. HENT: Negative. Eyes: Negative for blurred vision. Respiratory: Negative for shortness of breath. Cardiovascular: Negative for chest pain and palpitations. Gastrointestinal: Negative. Genitourinary: Negative. Musculoskeletal: Positive for back pain and neck pain. Skin: Positive for itching and rash. Neurological: Negative. Endo/Heme/Allergies: Negative. Psychiatric/Behavioral: Negative for depression, hallucinations, memory loss, substance abuse and suicidal ideas. The patient is nervous/anxious. The patient does not have insomnia. Objective:   No flowsheet data found. General: alert, cooperative, no distress   Mental  status: normal mood, behavior, speech, dress, motor activity, and thought processes, able to follow commands   HENT: NCAT   Neck: no visualized mass   Resp: no respiratory distress   Neuro: no gross deficits   Skin: no discoloration or lesions of concern on visible areas   Psychiatric: normal affect, consistent with stated mood, no evidence of hallucinations     Additional exam findings:   none    We discussed the expected course, resolution and complications of the diagnosis(es) in detail. Medication risks, benefits, costs, interactions, and alternatives were discussed as indicated. I advised her to contact the office if her condition worsens, changes or fails to improve as anticipated. She expressed understanding with the diagnosis(es) and plan. Irma Ousmane, who was evaluated through a patient-initiated, synchronous (real-time) audio-video encounter, and/or her healthcare decision maker, is aware that it is a billable service, with coverage as determined by her insurance carrier. She provided verbal consent to proceed: Yes, and patient identification was verified.  It was conducted pursuant to the emergency declaration under the [facility] authority and the [employer] Act. A caregiver was present when appropriate. Ability to conduct physical exam was limited. I was at home. The patient was at home.       Amarilis De Guzman NP

## 2020-09-27 NOTE — PATIENT INSTRUCTIONS
Sciatica: Care Instructions Your Care Instructions Sciatica (say \"rjw-AQ-ok-kuh\") is an irritation of one of the sciatic nerves, which come from the spinal cord in the lower back. The sciatic nerves and their branches extend down through the buttock to the foot. Sciatica can develop when an injured disc in the back irritates or presses against a spinal nerve root. Its main symptom is pain, numbness, or weakness that is often worse in the leg or foot than in the back. Sciatica often will improve and go away with time. Early treatment usually includes medicines and exercises to relieve pain. Follow-up care is a key part of your treatment and safety. Be sure to make and go to all appointments, and call your doctor if you are having problems. It's also a good idea to know your test results and keep a list of the medicines you take. How can you care for yourself at home? · Take pain medicines exactly as directed. ? If the doctor gave you a prescription medicine for pain, take it as prescribed. ? If you are not taking a prescription pain medicine, ask your doctor if you can take an over-the-counter medicine. · Use heat or ice to relieve pain. ? To apply heat, put a warm water bottle, heating pad set on low, or warm cloth on your back. Do not go to sleep with a heating pad on your skin. ? To use ice, put ice or a cold pack on the area for 10 to 20 minutes at a time. Put a thin cloth between the ice and your skin. · Avoid sitting if possible, unless it feels better than standing. · Alternate lying down with short walks. Increase your walking distance as you are able to without making your symptoms worse. · Do not do anything that makes your symptoms worse. When should you call for help? Call 911 anytime you think you may need emergency care. For example, call if: 
  · You are unable to move a leg at all. Call your doctor now or seek immediate medical care if:   · You have new or worse symptoms in your legs or buttocks. Symptoms may include: 
? Numbness or tingling. ? Weakness. ? Pain.  
  · You lose bladder or bowel control. Watch closely for changes in your health, and be sure to contact your doctor if: 
  · You are not getting better as expected. Where can you learn more? Go to http://héctor-isamar.info/ Enter 321-442-4262 in the search box to learn more about \"Sciatica: Care Instructions. \" Current as of: March 2, 2020               Content Version: 12.6 © 3878-6727 [employer], [employer]. Care instructions adapted under license by [employer] (which disclaims liability or warranty for this information). If you have questions about a medical condition or this instruction, always ask your healthcare professional. [address] any warranty or liability for your use of this information.

## 2021-04-13 ENCOUNTER — OFFICE VISIT (OUTPATIENT)
Dept: FAMILY MEDICINE CLINIC | Age: 41
End: 2021-04-13
Payer: MEDICAID

## 2021-04-13 VITALS
OXYGEN SATURATION: 98 % | BODY MASS INDEX: 21 KG/M2 | DIASTOLIC BLOOD PRESSURE: 60 MMHG | HEART RATE: 84 BPM | SYSTOLIC BLOOD PRESSURE: 90 MMHG | HEIGHT: 64 IN | TEMPERATURE: 98.6 F | RESPIRATION RATE: 18 BRPM | WEIGHT: 123 LBS

## 2021-04-13 DIAGNOSIS — N91.2 AMENORRHEA: Primary | ICD-10-CM

## 2021-04-13 DIAGNOSIS — Z3A.01 LESS THAN 8 WEEKS GESTATION OF PREGNANCY: ICD-10-CM

## 2021-04-13 LAB
HCG URINE, QL. (POC): POSITIVE
VALID INTERNAL CONTROL?: YES

## 2021-04-13 PROCEDURE — 81025 URINE PREGNANCY TEST: CPT | Performed by: NURSE PRACTITIONER

## 2021-04-13 PROCEDURE — 99213 OFFICE O/P EST LOW 20 MIN: CPT | Performed by: NURSE PRACTITIONER

## 2021-04-13 NOTE — PROGRESS NOTES
Chief Complaint   Patient presents with    Fatigue    Labs     Pt in office today for labs  -pt states she has been feeling fatigue  -pt states she thinks she is pregnant  -pt states her breast are hard and sore    1. Have you been to the ER, urgent care clinic since your last visit? Hospitalized since your last visit? No    2. Have you seen or consulted any other health care providers outside of the [address] [place] [address] since your last visit? Include any pap smears or colon screening.  No     Pt has no other concerns

## 2021-04-13 NOTE — PROGRESS NOTES
HISTORY OF PRESENT ILLNESS  Jose Mojica is a 36 y.o. female. HPI  Pt in office today for labs  -pt states she has been feeling fatigue  -pt states she thinks she is pregnant  -pt states her breast are hard and sore  Did at home test and Pos  Had been off her ocp    ROS  A comprehensive review of system was obtained and negative except findings in the HPI    Visit Vitals  BP 90/60 (BP 1 Location: Right arm, BP Patient Position: Sitting)   Pulse 84   Temp 98.6 °F (37 °C) (Oral)   Resp 18   Ht 5' 4\" (1.626 m)   Wt 123 lb (55.8 kg)   LMP 03/02/2021   SpO2 98%   BMI 21.11 kg/m²     Physical Exam  Vitals signs and nursing note reviewed. Constitutional:       Appearance: She is well-developed. Comments:      Neck:      Vascular: No JVD. Cardiovascular:      Rate and Rhythm: Normal rate and regular rhythm. Heart sounds: No murmur. No friction rub. No gallop. Pulmonary:      Effort: Pulmonary effort is normal. No respiratory distress. Breath sounds: Normal breath sounds. No wheezing. Skin:     General: Skin is warm. Neurological:      Mental Status: She is alert and oriented to person, place, and time. ASSESSMENT and PLAN  Encounter Diagnoses   Name Primary?  Amenorrhea Yes    Less than 8 weeks gestation of pregnancy      Orders Placed This Encounter    AMB POC URINE PREGNANCY TEST, VISUAL COLOR COMPARISON    prenatal vit-iron fumarate-fa 27 mg iron- 0.8 mg tab tablet     Urine preg Pos  Reviewed med and diet restrictions, given rx for prenatal vit  Already has appt with Dr. King Ward in 2 weeks    I have discussed the diagnosis with the patient and the intended plan as seen in the above orders. The patient has received an after-visit summary and questions were answered concerning future plans. Patient conveyed understanding of the plan at the time of the visit.     Valentino Leriche, MSN, ANP  4/13/2021

## 2021-04-13 NOTE — PATIENT INSTRUCTIONS
Safe to take in pregnancy:    Zyrtec 10mg  Claritin 10mg  Flonase nasal spray  Tylenol Sinus - tylenol + pseudoephedrine  Tylenol  Mucinex 600mg  Mucinex D       DO NOT TAKE:  Any product for cough and cold listed as \"DM\"  Advil  Motrin  Ibuprofen  Aleve  aspirin

## 2021-09-14 ENCOUNTER — OFFICE VISIT (OUTPATIENT)
Dept: FAMILY MEDICINE CLINIC | Age: 41
End: 2021-09-14
Payer: MEDICAID

## 2021-09-14 VITALS
HEART RATE: 90 BPM | SYSTOLIC BLOOD PRESSURE: 104 MMHG | TEMPERATURE: 99.2 F | DIASTOLIC BLOOD PRESSURE: 68 MMHG | WEIGHT: 127.6 LBS | BODY MASS INDEX: 21.78 KG/M2 | OXYGEN SATURATION: 99 % | HEIGHT: 64 IN

## 2021-09-14 DIAGNOSIS — R42 VERTIGO: ICD-10-CM

## 2021-09-14 DIAGNOSIS — G89.29 CHRONIC BILATERAL LOW BACK PAIN WITH RIGHT-SIDED SCIATICA: ICD-10-CM

## 2021-09-14 DIAGNOSIS — M54.2 NECK PAIN: ICD-10-CM

## 2021-09-14 DIAGNOSIS — M54.41 CHRONIC BILATERAL LOW BACK PAIN WITH RIGHT-SIDED SCIATICA: ICD-10-CM

## 2021-09-14 DIAGNOSIS — N92.6 MENSTRUAL IRREGULARITY: Primary | ICD-10-CM

## 2021-09-14 PROBLEM — G43.109 MIGRAINE WITH VERTIGO: Status: ACTIVE | Noted: 2021-09-14

## 2021-09-14 PROCEDURE — 99214 OFFICE O/P EST MOD 30 MIN: CPT | Performed by: STUDENT IN AN ORGANIZED HEALTH CARE EDUCATION/TRAINING PROGRAM

## 2021-09-14 RX ORDER — DICLOFENAC SODIUM 10 MG/G
2 GEL TOPICAL 4 TIMES DAILY
Qty: 200 G | Refills: 2 | Status: SHIPPED | OUTPATIENT
Start: 2021-09-14 | End: 2022-03-03 | Stop reason: ALTCHOICE

## 2021-09-14 NOTE — PATIENT INSTRUCTIONS
Contact insurance to find out when they cover colon cancer screening. Check out good feet store. [address] [place]. Make sure you have good cushion and fit to your shoes. Meclizine can be tried for dizziness / vertigo. Epley Maneuver at Home for Vertigo: Exercises  Introduction  Vertigo is a spinning or whirling sensation when you move your head. Your doctor may have moved you in different positions to help your vertigo get better faster. This is called the Epley maneuver. Your doctor also may have asked you to do these exercises at home. Do the exercises as often as your doctor recommends. If your vertigo is getting worse, your doctor may have you change the exercise or stop it. Step 1  Step 1   1. Sit on the edge of a bed or sofa. Step 2   1. Turn your head 45 degrees in the direction your doctor told you to. This should be toward the ear that causes the most vertigo for you. In this picture, the woman is turning toward her left ear. Step 3   1. Tilt yourself backward until you are lying on your back. Your head should still be at a 45-degree turn. Your head should be about midway between looking straight ahead and looking out to your side. Hold for 30 seconds. If you have vertigo, stay in this position until it stops. Step 4   1. Turn your head 90 degrees toward the ear that has the least vertigo. In this picture, the woman is turning to the right because she has vertigo on her left side. The point of your chin should be raised and over your shoulder. Hold for 30 seconds. Step 5   1. Roll onto the side with the least vertigo. You should now be looking at the floor. Hold for 30 seconds. Follow-up care is a key part of your treatment and safety. Be sure to make and go to all appointments, and call your doctor if you are having problems. It's also a good idea to know your test results and keep a list of the medicines you take. Where can you learn more?   Go to http://www.gray.com/  Enter P834 in the search box to learn more about \"Epley Maneuver at Home for Vertigo: Exercises. \"  Current as of: August 4, 2020               Content Version: 12.8  © 2006-2021 Healthwise, [facility]. Care instructions adapted under license by [employer] (which disclaims liability or warranty for this information). If you have questions about a medical condition or this instruction, always ask your healthcare professional. [address] any warranty or liability for your use of this information.

## 2021-09-14 NOTE — PROGRESS NOTES
Victor Hugo Us is a 36 y.o. female , id x 2(name and ). Reviewed record, history, and  medications. Chief Complaint   Patient presents with    Personal Problem    Back Pain    Knee Pain    Irregular Menses     states that she missed her menses last month. Miscarried in Feb. Has been having night sweats     Dizziness     states that she gets vertigo in her sleep at times only on her (R) side     Colon Cancer Screening     Mother has had colon cancer       Vitals:    21 1406   BP: 104/68   Pulse: 90   Temp: 99.2 °F (37.3 °C)   SpO2: 99%   Weight: 127 lb 9.6 oz (57.9 kg)   Height: 5' 4\" (1.626 m)       Coordination of Care Questionnaire:   1) Have you been to an emergency room, urgent care, or hospitalized since your last visit?   no       2. Have seen or consulted any other health care provider since your last visit? NO      3 most recent PHQ Screens 2021   PHQ Not Done -   Little interest or pleasure in doing things Not at all   Feeling down, depressed, irritable, or hopeless Not at all   Total Score PHQ 2 0       Patient is accompanied by self I have received verbal consent from Victor Hugo Us to discuss any/all medical information while they are present in the room.

## 2021-09-14 NOTE — PROGRESS NOTES
Progress Note    she is a 36y.o. year old female who presents for evalution. Chief Complaint   Patient presents with    Personal Problem    Back Pain    Knee Pain    Irregular Menses     states that she missed her menses last month. Miscarried in Feb. Has been having night sweats     Dizziness     states that she gets vertigo in her sleep at times only on her (R) side     Colon Cancer Screening     Mother has had colon cancer         Assessment/ Plan:   Diagnoses and all orders for this visit:    1. Menstrual irregularity - possibly related to recent miscarriage or early menopause. Continue to monitor cycles. 2. Neck pain  -     diclofenac (VOLTAREN) 1 % gel; Apply 2 g to affected area four (4) times daily. 3. Chronic bilateral low back pain with right-sided sciatica  -     diclofenac (VOLTAREN) 1 % gel; Apply 2 g to affected area four (4) times daily. 4. Vertigo - encouraged to trial home exercises and meclizine         Follow-up and Dispositions    · Return for follow-up for fasting labs at your convenience. I have discussed the diagnosis with the patient and the intended plan as seen in the above orders. The patient has received an after-visit summary and questions were answered concerning future plans. Pt conveyed understanding of plan. Medication Side Effects and Warnings were discussed with patient        Subjective:     Chief Complaint   Patient presents with    Personal Problem    Back Pain    Knee Pain    Irregular Menses     states that she missed her menses last month. Miscarried in Feb. Has been having night sweats     Dizziness     states that she gets vertigo in her sleep at times only on her (R) side     Colon Cancer Screening     Mother has had colon cancer     Pregnant in February and miscarried. Menses just restarted after a missed month  Sweats and lack of sleep. Mother uncertain of when she went through menopause. Sees gyn for pap smears.   Last was 2019, normal.    Concerned about vertigo  Feels dizzy during sleep. Feeling spinning and off balance. Happens when she lays on her right side. No concerns with bowel movement. Mom diagnosed at 58. Pain in left knee and right lower back. No injury reported. Mild discomfort if walking too long. Pain in left shoulder. Reviewed PmHx, RxHx, FmHx, SocHx, AllgHx and updated and dated in the chart. Review of Systems - negative except as listed above in the HPI    Objective:     Vitals:    09/14/21 1406   BP: 104/68   Pulse: 90   Temp: 99.2 °F (37.3 °C)   SpO2: 99%   Weight: 127 lb 9.6 oz (57.9 kg)   Height: 5' 4\" (1.626 m)       Current Outpatient Medications   Medication Sig    diclofenac (VOLTAREN) 1 % gel Apply 2 g to affected area four (4) times daily. No current facility-administered medications for this visit. Physical Exam  Vitals and nursing note reviewed. Constitutional:       General: She is not in acute distress. Appearance: Normal appearance. She is not ill-appearing, toxic-appearing or diaphoretic. HENT:      Head: Normocephalic and atraumatic. Eyes:      General: No scleral icterus. Right eye: No discharge. Left eye: No discharge. Conjunctiva/sclera: Conjunctivae normal.   Cardiovascular:      Rate and Rhythm: Normal rate and regular rhythm. Pulses: Normal pulses. Heart sounds: Normal heart sounds. Pulmonary:      Effort: Pulmonary effort is normal. No respiratory distress. Breath sounds: Normal breath sounds. Musculoskeletal:         General: No swelling, tenderness or deformity. Normal range of motion. Cervical back: No rigidity. Right lower leg: No edema. Left lower leg: No edema. Skin:     General: Skin is warm and dry. Neurological:      General: No focal deficit present. Mental Status: She is alert. Sensory: No sensory deficit. Motor: No weakness.       Gait: Gait normal.   Psychiatric: Mood and Affect: Mood normal.         Behavior: Behavior normal.         Thought Content:  Thought content normal.         Judgment: Judgment normal.              Viola Jackson MD

## 2021-09-21 PROBLEM — R42 VERTIGO: Status: ACTIVE | Noted: 2021-09-14

## 2021-10-12 ENCOUNTER — OFFICE VISIT (OUTPATIENT)
Dept: FAMILY MEDICINE CLINIC | Age: 41
End: 2021-10-12
Payer: MEDICAID

## 2021-10-12 VITALS
DIASTOLIC BLOOD PRESSURE: 70 MMHG | HEART RATE: 84 BPM | OXYGEN SATURATION: 98 % | WEIGHT: 123.38 LBS | TEMPERATURE: 98.4 F | SYSTOLIC BLOOD PRESSURE: 107 MMHG | RESPIRATION RATE: 18 BRPM | BODY MASS INDEX: 21.06 KG/M2 | HEIGHT: 64 IN

## 2021-10-12 DIAGNOSIS — L30.4 INTERTRIGO: ICD-10-CM

## 2021-10-12 DIAGNOSIS — F32.A DEPRESSION, UNSPECIFIED DEPRESSION TYPE: Primary | ICD-10-CM

## 2021-10-12 DIAGNOSIS — N92.6 MENSTRUAL IRREGULARITY: ICD-10-CM

## 2021-10-12 DIAGNOSIS — F41.9 ANXIETY: ICD-10-CM

## 2021-10-12 DIAGNOSIS — E78.00 ELEVATED CHOLESTEROL: ICD-10-CM

## 2021-10-12 PROCEDURE — 99214 OFFICE O/P EST MOD 30 MIN: CPT | Performed by: STUDENT IN AN ORGANIZED HEALTH CARE EDUCATION/TRAINING PROGRAM

## 2021-10-12 RX ORDER — CLOTRIMAZOLE AND BETAMETHASONE DIPROPIONATE 10; .64 MG/G; MG/G
CREAM TOPICAL
Qty: 15 G | Refills: 1 | Status: SHIPPED | OUTPATIENT
Start: 2021-10-12 | End: 2022-03-03 | Stop reason: ALTCHOICE

## 2021-10-12 NOTE — PROGRESS NOTES
Progress Note    she is a 36y.o. year old female who presents for evalution. Chief Complaint   Patient presents with    Labs     labs - pt is fasting    Hives     having alot of hives- this am on legs    Medication Refill     refill steroid cream for rash         Assessment/ Plan:   Diagnoses and all orders for this visit:    1. Depression, unspecified depression type  Assessment & Plan:  Pt prefers to avoid medication. Reviewed importance of medication, therapy, and lifestyle modifications for the treatment of mood disorders. Encouraged to have a safety plan in regards to SI      2. Anxiety  -     CBC WITH AUTOMATED DIFF; Future  -     TSH 3RD GENERATION; Future  -     VITAMIN D, 25 HYDROXY; Future    3. Elevated cholesterol  -     METABOLIC PANEL, COMPREHENSIVE; Future  -     HEMOGLOBIN A1C WITH EAG; Future  -     LIPID PANEL; Future  -     CBC WITH AUTOMATED DIFF; Future  -     TSH 3RD GENERATION; Future    4. Menstrual irregularity  -     CBC WITH AUTOMATED DIFF; Future  -     TSH 3RD GENERATION; Future    5. Intertrigo  -     clotrimazole-betamethasone (LOTRISONE) topical cream; Apply  to affected area two (2) times daily as needed (fungal rash/irritation). Other orders  -     ammonium lactate (LAC-HYDRIN/BUFFY-HYDROLAC) 5 % lotion; Apply  to affected area two (2) times daily as needed (keratosis pilaris). -     CVD REPORT     Follow-up and Dispositions    · Return in about 4 weeks (around 11/9/2021) for follow-up mood 2-4 weeks. I have discussed the diagnosis with the patient and the intended plan as seen in the above orders. The patient has received an after-visit summary and questions were answered concerning future plans. Pt conveyed understanding of plan.     Medication Side Effects and Warnings were discussed with patient        Subjective:     Chief Complaint   Patient presents with    Labs     labs - pt is fasting    Hives     having alot of hives- this am on legs    Medication Refill     refill steroid cream for rash     Avoiding sleeping on right side due to dizziness  Did not try exercises or meclizine. Hives continue off and on. Longstanding issue  Worse with tighter clothing. Itching. Last 15-20 minutes  Previously saw an allergist, did not find anything. Rash between breasts. Red appearance, very itchy. Brought on by sweating while she sleeps    Reports dryness, itching of lower legs. Mood is poor. Some days worse than others  Crying spells, feeling overwhelmed. Occasional SI, no intent or plan  Doesn't have people she feels that she could turn to, feels it would be used against her      Reviewed PmHx, RxHx, FmHx, SocHx, AllgHx and updated and dated in the chart. Review of Systems - negative except as listed above in the HPI    Objective:     Vitals:    10/12/21 1025   BP: 107/70   Pulse: 84   Resp: 18   Temp: 98.4 °F (36.9 °C)   TempSrc: Oral   SpO2: 98%   Weight: 123 lb 6 oz (56 kg)   Height: 5' 4\" (1.626 m)       Current Outpatient Medications   Medication Sig    clotrimazole-betamethasone (LOTRISONE) topical cream Apply  to affected area two (2) times daily as needed (fungal rash/irritation).  ammonium lactate (LAC-HYDRIN/BUFFY-HYDROLAC) 5 % lotion Apply  to affected area two (2) times daily as needed (keratosis pilaris).  diclofenac (VOLTAREN) 1 % gel Apply 2 g to affected area four (4) times daily. No current facility-administered medications for this visit. Physical Exam  Vitals and nursing note reviewed. Constitutional:       General: She is not in acute distress. Appearance: Normal appearance. She is not ill-appearing, toxic-appearing or diaphoretic. HENT:      Head: Normocephalic and atraumatic. Eyes:      General: No scleral icterus. Right eye: No discharge. Left eye: No discharge. Conjunctiva/sclera: Conjunctivae normal.   Cardiovascular:      Rate and Rhythm: Normal rate and regular rhythm. Pulses: Normal pulses. Heart sounds: Normal heart sounds. Pulmonary:      Effort: Pulmonary effort is normal. No respiratory distress. Breath sounds: Normal breath sounds. Musculoskeletal:         General: No tenderness. Cervical back: No rigidity. Right lower leg: No edema. Left lower leg: No edema. Skin:     General: Skin is warm and dry. Findings: No erythema or rash. Neurological:      General: No focal deficit present. Mental Status: She is alert. Psychiatric:         Mood and Affect: Mood is anxious and depressed. Behavior: Behavior normal.         Thought Content:  Thought content normal.         Judgment: Judgment normal.              Isadora Boyd MD

## 2021-10-12 NOTE — LETTER
10/25/2021    Ms. Mya Garcia 34  [address] [id number]      Dear Mya Shah:    Please find your most recent results below. Resulted Orders   METABOLIC PANEL, COMPREHENSIVE   Result Value Ref Range    Glucose 98 65 - 99 mg/dL    BUN 17 6 - 24 mg/dL    Creatinine 0.60 0.57 - 1.00 mg/dL    GFR est non- >59 mL/min/1.73    GFR est  >59 mL/min/1.73    BUN/Creatinine ratio 28 (H) 9 - 23    Sodium 139 134 - 144 mmol/L    Potassium 4.8 3.5 - 5.2 mmol/L    Chloride 103 96 - 106 mmol/L    CO2 23 20 - 29 mmol/L    Calcium 9.6 8.7 - 10.2 mg/dL    Protein, total 7.7 6.0 - 8.5 g/dL    Albumin 5.0 (H) 3.8 - 4.8 g/dL    GLOBULIN, TOTAL 2.7 1.5 - 4.5 g/dL    A-G Ratio 1.9 1.2 - 2.2    Bilirubin, total 0.4 0.0 - 1.2 mg/dL    Alk. phosphatase 61 44 - 121 IU/L    AST (SGOT) 13 0 - 40 IU/L    ALT (SGPT) 9 0 - 32 IU/L   HEMOGLOBIN A1C WITH EAG   Result Value Ref Range    Hemoglobin A1c 6.0 (H) 4.8 - 5.6 %    Estimated average glucose 126 mg/dL   LIPID PANEL   Result Value Ref Range    Cholesterol, total 209 (H) 100 - 199 mg/dL    Triglyceride 75 0 - 149 mg/dL    HDL Cholesterol 57 >39 mg/dL    VLDL, calculated 13 5 - 40 mg/dL    LDL, calculated 139 (H) 0 - 99 mg/dL   VITAMIN D, 25 HYDROXY   Result Value Ref Range    VITAMIN D, 25-HYDROXY 28.8 (L) 30.0 - 100.0 ng/mL       RECOMMENDATIONS:  Attached are the results of your most recent lab work. I have the following recommendations:     1. Your CBC which looks at your white blood cells, red blood cells, and hemoglobin came back looking normal. No sign of infection or anemia.      2. Your metabolic panel which looks at your blood glucose, electrolytes, liver function, and kidney function looks perfect.      3. Your cholesterol is moderately elevated. Continue with your efforts to follow a heart healthy diet and exercise routinely. As you know the BEST way to lower cholesterol is to follow a strict diet that is low fat combined with regular exercise.  Here are a few tips on how to do this:  - Avoid foods that are high in saturated and trans fats (especially fried foods)   - Replace butter with olive oil, avocado oil, other oils that are primarily high in unsaturated fats  - Eat lots of fresh fruits and vegetables  - Choose fish, chicken, and turkey as your serving of meat  - Avoid too many processed foods  - Use whole wheat bread, pasta, rice  You should also try and do 30 minutes of aerobic exercise most days of the week. All of these will contribute to lowering your cholesterol and decrease your risk of heart disease and stroke.      4. Your TSH which screens for thyroid disease came back normal. This means you likely do not have hyper or hypothyroidism.      5. Your A1c is a marker for blood sugars over the last 3 months and is elevated.  Currently it is in the prediabetic range.  I recommend that you focus on healthy diet and exercise to prevent developing diabetes.  Diet should focus on minimizing sugar and simple carbohydrates in your diet.  Check out [email or web address].org for good recipes and tips.     6. Your vitamin D level is very low or deficient. I would like you to take a daily vitamin D supplement of 1000 IU to replete this.  We should recheck with labs in 3 months  Make sure you are getting adequate dietary calcium.    Having normal vitamin D levels is important because you need vitamin D to absorb calcium into the bone and having low vitamin D levels increases your risk for osteoporosis down the road.      Some values may be minimally outside the \"normal\" range but are not harmful or clinically significant. Please let me know if you have any questions or concerns regarding these results.  I recommend we repeat fasting labs in 3 months    Please call me if you have any questions: [phone]    Sincerely,      Harman Rivas MD

## 2021-10-12 NOTE — PATIENT INSTRUCTIONS
Lifestyle areas to focus on. .. Exercise  Nutrition  Sleep  Mindfulness - deep/diaphragmatic breathing, progressive muscle relaxation, guided imagery, yoga, karen chi, meditation, prayer, Mu-ism reading, journaling, coloring/art  (5 minutes, 3 times each day)  Social connectedness    WILD 5 workbook (check it out on [employer])      Instructions to find a therapist:    1. Talk to insurance or look online to find in-network therapy options. 2.  Find out insurance out of network reimbursement. Look into options: [employer], there are lots of text and video/phone therapy options online as well. If at any time you need a referral, please contact our office. Try the exercises at home for dizziness  Let me know if you'd prefer to see PT. Try aquaphor ointment (in a tub) for daily skin maintenance    Try cortisone as needed for the hives. Dry Skin: Care Instructions  Your Care Instructions  Dry skin is a common problem, especially in areas where the air is very dry. Dry skin can also become a problem as you get older and lose natural oils that keep your skin moist.  A tendency toward dry, itchy skin may run in families. Some problems with the body's defenses (immune system), allergies, or an infection with a fungus may also cause patches of dry skin. An over-the-counter cream may help your dry skin. If your skin problem does not get better with home treatment, your doctor may prescribe ointment. You may need antibiotics if you have a skin infection. Follow-up care is a key part of your treatment and safety. Be sure to make and go to all appointments, and call your doctor if you are having problems. It's also a good idea to know your test results and keep a list of the medicines you take. How can you care for yourself at home? Showers and baths  · Keep showers and baths short, and use warm or lukewarm water. Don't use hot water. It takes off more of your skin's natural oils.   · Choose a mild skin cleanser like Aquanil or Cetaphil. · If you are taking a bath, use a skin cleanser at the very end. Then rinse off with fresh water. Gently pat your skin dry with a towel. Skin creams and moisturizers  · Apply moisturizer or skin cream right away (within 3 minutes) after a bath or shower. Use a moisturizer at other times too, as often as you need it. · Moisturizing creams are better than lotions. Try brands like CeraVe cream, Cetaphil cream, or Eucerin cream.  Other tips  · When washing clothes, use a small amount of detergent. Don't use fabric softeners or dryer sheets. · For small areas of itchy skin, try an over-the-counter 1% hydrocortisone cream.  · If you have very dry hands, spread petroleum jelly (such as Vaseline) on your hands before bed. Wear thin cotton gloves while you sleep. If your feet are dry, spread Vaseline on them and wear socks while you sleep. When should you call for help? Call your doctor now or seek immediate medical care if:    · You have signs of infection, such as:  ? Pain, warmth, or swelling in the skin. ? Red streaks near a wound in the skin. ? Pus coming from a wound in your skin. ? A fever. Watch closely for changes in your health, and be sure to contact your doctor if:    · You do not get better as expected. Where can you learn more? Go to http://www.gray.com/  Enter [id number] in the search box to learn more about \"Dry Skin: Care Instructions. \"  Current as of: March 3, 2021               Content Version: 13.0  © 3925-7886 [employer]. Care instructions adapted under license by [employer] (which disclaims liability or warranty for this information). If you have questions about a medical condition or this instruction, always ask your healthcare professional. [place] [address] any warranty or liability for your use of this information.        Keratosis Pilaris in Children: Care Instructions  Overview  Keratosis pilaris is a skin problem. It hardens the skin around pores or hair follicles. A hair follicle is the place where a hair begins to grow. Children may have small, red bumps anywhere on their skin, but often on their cheeks, arms, or thighs. You might notice them more in winter than summer. The bumps may come and go. Often, they go away as a child gets older. In some cases, this skin problem is passed down from family members. It is more common in children who have asthma, hay fever, eczema, or other skin problems. This problem is not an infection, and it is not contagious. Your child can't spread it to others. It also won't hurt your child and it usually doesn't itch. Regularly applying a moisturizing cream may help your child's skin look better. Follow-up care is a key part of your child's treatment and safety. Be sure to make and go to all appointments, and call your doctor if your child is having problems. It's also a good idea to know your child's test results and keep a list of the medicines your child takes. How can you care for your child at home? · Use a gentle soap or cleanser that won't dry your child's skin. Good choices are Aveeno, Dove, and Neutrogena. · Put a mild, over-the-counter moisturizing cream on your child's skin. A product with lactic acid, salicylic acid, or urea may help. Follow the directions on the container. · If your child's doctor prescribes a cream, use it as directed. If the doctor prescribes medicine, give it exactly as directed. When should you call for help? Call your doctor now or seek immediate medical care if:    · Your child has symptoms of infection, such as:  ? Increased pain, swelling, warmth, or redness. ? Red streaks leading from the area. ? Pus draining from the area. ? A fever. Watch closely for changes in your child's health, and be sure to contact your doctor if:    · Your child does not get better as expected.    Where can you learn more? Go to http://www.gray.com/  Enter Q729 in the search box to learn more about \"Keratosis Pilaris in Children: Care Instructions. \"  Current as of: March 3, 2021               Content Version: 13.0  © 6415-0710 [employer]. Care instructions adapted under license by [employer] (which disclaims liability or warranty for this information). If you have questions about a medical condition or this instruction, always ask your healthcare professional. [address] any warranty or liability for your use of this information. Hives: Care Instructions  Your Care Instructions  Hives are raised, red, itchy patches of skin. They are also called wheals or welts. They usually have red borders and pale centers. Hives range in size from ¼ inch to 3 inches or more across. They may seem to move from place to place on the skin. Several hives may form a large area of raised, red skin. You can get hives after an insect sting, after taking medicine or eating certain foods, or because of infection or stress. Other causes include plants, things you breathe in, makeup, heat, cold, sunlight, and latex. You cannot spread hives to other people. Hives may last a few minutes or a few days, but a single spot may last less than 36 hours. Follow-up care is a key part of your treatment and safety. Be sure to make and go to all appointments, and call your doctor if you are having problems. It's also a good idea to know your test results and keep a list of the medicines you take. How can you care for yourself at home? · Avoid whatever you think may have caused your hives, such as a certain food or medicine. However, you may not know the cause. · Put a cool, wet towel on the area to relieve itching.   · Take an over-the-counter antihistamine, such as diphenhydramine (Benadryl), cetirizine (Zyrtec), or loratadine (Claritin), to help stop the hives and calm the itching. Read and follow directions on the label. These medicines can make you feel sleepy. Do not drive while using them. · Stay away from strong soaps, detergents, and chemicals. These can make itching worse. When should you call for help? Call 911 anytime you think you may need emergency care. For example, call if:    · You have symptoms of a severe allergic reaction. These may include:  ? Sudden raised, red areas (hives) all over your body. ? Swelling of the throat, mouth, lips, or tongue. ? Trouble breathing. ? Passing out (losing consciousness). Or you may feel very lightheaded or suddenly feel weak, confused, or restless. Call your doctor now or seek immediate medical care if:    · You have symptoms of an allergic reaction, such as:  ? A rash or hives (raised, red areas on the skin). ? Itching. ? Swelling. ? Belly pain, nausea, or vomiting.     · You get hives after you start a new medicine.     · Hives have not gone away after 24 hours. Watch closely for changes in your health, and be sure to contact your doctor if:    · You do not get better as expected. Where can you learn more? Go to http://www.gray.com/  Enter [id number] in the search box to learn more about \"Hives: Care Instructions. \"  Current as of: July 1, 2021               Content Version: 13.0  © 2256-3907 [employer]. Care instructions adapted under license by [employer] (which disclaims liability or warranty for this information). If you have questions about a medical condition or this instruction, always ask your healthcare professional. [place] [address] any warranty or liability for your use of this information.

## 2021-10-12 NOTE — PROGRESS NOTES
Chief Complaint   Patient presents with    Labs     labs - pt is fasting    Hives     having alot of hives- this am on legs    Medication Refill     refill steroid cream for rash     1. Have you been to the ER, urgent care clinic since your last visit? Hospitalized since your last visit? No    2. Have you seen or consulted any other health care providers outside of the [address] [place] [address] since your last visit? Include any pap smears or colon screening.  No

## 2021-10-13 LAB
25(OH)D3+25(OH)D2 SERPL-MCNC: 28.8 NG/ML (ref 30–100)
ALBUMIN SERPL-MCNC: 5 G/DL (ref 3.8–4.8)
ALBUMIN/GLOB SERPL: 1.9 {RATIO} (ref 1.2–2.2)
ALP SERPL-CCNC: 61 IU/L (ref 44–121)
ALT SERPL-CCNC: 9 IU/L (ref 0–32)
AST SERPL-CCNC: 13 IU/L (ref 0–40)
BASOPHILS # BLD AUTO: 0 X10E3/UL (ref 0–0.2)
BASOPHILS NFR BLD AUTO: 1 %
BILIRUB SERPL-MCNC: 0.4 MG/DL (ref 0–1.2)
BUN SERPL-MCNC: 17 MG/DL (ref 6–24)
BUN/CREAT SERPL: 28 (ref 9–23)
CALCIUM SERPL-MCNC: 9.6 MG/DL (ref 8.7–10.2)
CHLORIDE SERPL-SCNC: 103 MMOL/L (ref 96–106)
CHOLEST SERPL-MCNC: 209 MG/DL (ref 100–199)
CO2 SERPL-SCNC: 23 MMOL/L (ref 20–29)
CREAT SERPL-MCNC: 0.6 MG/DL (ref 0.57–1)
EOSINOPHIL # BLD AUTO: 0.1 X10E3/UL (ref 0–0.4)
EOSINOPHIL NFR BLD AUTO: 1 %
ERYTHROCYTE [DISTWIDTH] IN BLOOD BY AUTOMATED COUNT: 13 % (ref 11.7–15.4)
EST. AVERAGE GLUCOSE BLD GHB EST-MCNC: 126 MG/DL
GLOBULIN SER CALC-MCNC: 2.7 G/DL (ref 1.5–4.5)
GLUCOSE SERPL-MCNC: 98 MG/DL (ref 65–99)
HBA1C MFR BLD: 6 % (ref 4.8–5.6)
HCT VFR BLD AUTO: 44.6 % (ref 34–46.6)
HDLC SERPL-MCNC: 57 MG/DL
HGB BLD-MCNC: 14.4 G/DL (ref 11.1–15.9)
IMM GRANULOCYTES # BLD AUTO: 0 X10E3/UL (ref 0–0.1)
IMM GRANULOCYTES NFR BLD AUTO: 0 %
IMP & REVIEW OF LAB RESULTS: NORMAL
LDLC SERPL CALC-MCNC: 139 MG/DL (ref 0–99)
LYMPHOCYTES # BLD AUTO: 1.8 X10E3/UL (ref 0.7–3.1)
LYMPHOCYTES NFR BLD AUTO: 38 %
MCH RBC QN AUTO: 28.9 PG (ref 26.6–33)
MCHC RBC AUTO-ENTMCNC: 32.3 G/DL (ref 31.5–35.7)
MCV RBC AUTO: 89 FL (ref 79–97)
MONOCYTES # BLD AUTO: 0.3 X10E3/UL (ref 0.1–0.9)
MONOCYTES NFR BLD AUTO: 6 %
NEUTROPHILS # BLD AUTO: 2.6 X10E3/UL (ref 1.4–7)
NEUTROPHILS NFR BLD AUTO: 54 %
PLATELET # BLD AUTO: 204 X10E3/UL (ref 150–450)
POTASSIUM SERPL-SCNC: 4.8 MMOL/L (ref 3.5–5.2)
PROT SERPL-MCNC: 7.7 G/DL (ref 6–8.5)
RBC # BLD AUTO: 4.99 X10E6/UL (ref 3.77–5.28)
SODIUM SERPL-SCNC: 139 MMOL/L (ref 134–144)
TRIGL SERPL-MCNC: 75 MG/DL (ref 0–149)
TSH SERPL DL<=0.005 MIU/L-ACNC: 1.17 UIU/ML (ref 0.45–4.5)
VLDLC SERPL CALC-MCNC: 13 MG/DL (ref 5–40)
WBC # BLD AUTO: 4.8 X10E3/UL (ref 3.4–10.8)

## 2021-10-15 PROBLEM — R73.03 PREDIABETES: Status: ACTIVE | Noted: 2021-10-15

## 2021-10-15 PROBLEM — E55.9 HYPOVITAMINOSIS D: Status: ACTIVE | Noted: 2021-10-15

## 2021-10-15 NOTE — PROGRESS NOTES
Normal CMP, CBC, TSH  Moderately elevated LDL cholesterol and A1c prediabetic, focus on healthy lifestyle  Low Vitamin D, start otc supplement

## 2021-10-21 PROBLEM — F32.A DEPRESSION: Status: ACTIVE | Noted: 2021-10-21

## 2021-10-22 NOTE — ASSESSMENT & PLAN NOTE
Pt prefers to avoid medication. Reviewed importance of medication, therapy, and lifestyle modifications for the treatment of mood disorders.   Encouraged to have a safety plan in regards to SI

## 2022-01-13 ENCOUNTER — VIRTUAL VISIT (OUTPATIENT)
Dept: FAMILY MEDICINE CLINIC | Age: 42
End: 2022-01-13
Payer: MEDICAID

## 2022-01-13 DIAGNOSIS — K21.9 GASTROESOPHAGEAL REFLUX DISEASE WITHOUT ESOPHAGITIS: Primary | ICD-10-CM

## 2022-01-13 PROCEDURE — 99213 OFFICE O/P EST LOW 20 MIN: CPT | Performed by: NURSE PRACTITIONER

## 2022-01-13 RX ORDER — PANTOPRAZOLE SODIUM 40 MG/1
40 TABLET, DELAYED RELEASE ORAL DAILY
Qty: 30 TABLET | Refills: 5 | Status: SHIPPED | OUTPATIENT
Start: 2022-01-13 | End: 2022-03-03 | Stop reason: ALTCHOICE

## 2022-01-13 NOTE — PROGRESS NOTES
Chief Complaint   Patient presents with    Abdominal Pain     Pt being seen for abdominal pain  -pt states this has been bothering her off and on but more recently   -pt states she has a bowel movement daily but the last two days has been smaller    1. Have you been to the ER, urgent care clinic since your last visit? Hospitalized since your last visit? No    2. Have you seen or consulted any other health care providers outside of the [address] [place] [address] since your last visit? Include any pap smears or colon screening.  No     Pt has no other concerns

## 2022-01-13 NOTE — PROGRESS NOTES
Meseret Lvoett (: 1980) is a 39 y.o. female, established patient, here for evaluation of the following chief complaint(s):   Abdominal Pain       ASSESSMENT/PLAN:  Below is the assessment and plan developed based on review of pertinent history, labs, studies, and medications. Diagnoses and all orders for this visit:    1. Gastroesophageal reflux disease without esophagitis    Other orders  -     pantoprazole (PROTONIX) 40 mg tablet; Take 1 Tablet by mouth daily.     given protonix to take once daily  Reviewed food triggers  Will refer to GI if not improved    SUBJECTIVE/OBJECTIVE:  HPI  Having severe epigastric pain  Admits that she does not follow a great diet, high in fat at times  Used pepcid without relief  Concerned that mom just dx with colon cancer  No dark stools or blood in the stool    Review of Systems   A comprehensive review of system was obtained and negative except findings in the HPI    No data recorded     Physical Exam    [INSTRUCTIONS:  \"[x]\" Indicates a positive item  \"[]\" Indicates a negative item  -- DELETE ALL ITEMS NOT EXAMINED]    Constitutional: [x] Appears well-developed and well-nourished [x] No apparent distress      [] Abnormal -     Mental status: [x] Alert and awake  [x] Oriented to person/place/time [x] Able to follow commands    [] Abnormal -     Eyes:   EOM    [x]  Normal    [] Abnormal -   Sclera  [x]  Normal    [] Abnormal -          Discharge [x]  None visible   [] Abnormal -     HENT: [x] Normocephalic, atraumatic  [] Abnormal -   [x] Mouth/Throat: Mucous membranes are moist    External Ears [x] Normal  [] Abnormal -    Neck: [x] No visualized mass [] Abnormal -     Pulmonary/Chest: [x] Respiratory effort normal   [x] No visualized signs of difficulty breathing or respiratory distress        [] Abnormal -      Musculoskeletal:   [x] Normal gait with no signs of ataxia         [x] Normal range of motion of neck        [] Abnormal -     Neurological:        [x] No Facial Asymmetry (Cranial nerve 7 motor function) (limited exam due to video visit)          [x] No gaze palsy        [] Abnormal -          Skin:        [x] No significant exanthematous lesions or discoloration noted on facial skin         [] Abnormal -            Psychiatric:       [x] Normal Affect [] Abnormal -        [x] No Hallucinations    Other pertinent observable physical exam findings:-        On this date 01/13/2022 I have spent 15 minutes reviewing previous notes, test results and face to face (virtual) with the patient discussing the diagnosis and importance of compliance with the treatment plan as well as documenting on the day of the visit. Meseret Lovett, was evaluated through a synchronous (real-time) audio-video encounter. The patient (or guardian if applicable) is aware that this is a billable service. Verbal consent to proceed has been obtained within the past 12 months. The visit was conducted pursuant to the emergency declaration under the [address] [address], [address] [place] [address] authority and the [employer] and [employer] General Act. Patient identification was verified, and a caregiver was present when appropriate. The patient was located in a state where the provider was credentialed to provide care. An electronic signature was used to authenticate this note.   -- Jonh De Oliveira NP

## 2022-03-03 ENCOUNTER — OFFICE VISIT (OUTPATIENT)
Dept: FAMILY MEDICINE CLINIC | Age: 42
End: 2022-03-03
Payer: MEDICAID

## 2022-03-03 VITALS
WEIGHT: 124 LBS | OXYGEN SATURATION: 96 % | HEART RATE: 91 BPM | HEIGHT: 64 IN | BODY MASS INDEX: 21.17 KG/M2 | RESPIRATION RATE: 18 BRPM | SYSTOLIC BLOOD PRESSURE: 115 MMHG | DIASTOLIC BLOOD PRESSURE: 76 MMHG

## 2022-03-03 DIAGNOSIS — L30.4 INTERTRIGO: ICD-10-CM

## 2022-03-03 DIAGNOSIS — R10.13 EPIGASTRIC PAIN: Primary | ICD-10-CM

## 2022-03-03 DIAGNOSIS — K21.9 GASTROESOPHAGEAL REFLUX DISEASE WITHOUT ESOPHAGITIS: ICD-10-CM

## 2022-03-03 PROCEDURE — 99214 OFFICE O/P EST MOD 30 MIN: CPT | Performed by: NURSE PRACTITIONER

## 2022-03-03 RX ORDER — FLUCONAZOLE 100 MG/1
100 TABLET ORAL DAILY
Qty: 7 TABLET | Refills: 0 | Status: SHIPPED | OUTPATIENT
Start: 2022-03-03 | End: 2022-03-10

## 2022-03-03 RX ORDER — FAMOTIDINE 40 MG/1
40 TABLET, FILM COATED ORAL
Qty: 60 TABLET | Refills: 1 | Status: SHIPPED | OUTPATIENT
Start: 2022-03-03 | End: 2022-07-20 | Stop reason: SDUPTHER

## 2022-03-03 RX ORDER — CLOTRIMAZOLE AND BETAMETHASONE DIPROPIONATE 10; .64 MG/G; MG/G
CREAM TOPICAL
Qty: 45 G | Refills: 1 | Status: SHIPPED | OUTPATIENT
Start: 2022-03-03

## 2022-03-03 NOTE — PROGRESS NOTES
HISTORY OF PRESENT ILLNESS  Valentine Mei is a 39 y.o. female. HPI  She is here having epigastric pain and gas  Had been taking protonix but not helping  Really cut back on spicy foods in the last few days that helped but also having green stools  Would like to see GI    Having yeast rash between the breasts and in belly button  Topical lotrisone not helping    ROS  A comprehensive review of system was obtained and negative except findings in the HPI    Visit Vitals  /76 (BP 1 Location: Left upper arm, BP Patient Position: Sitting, BP Cuff Size: Adult)   Pulse 91   Resp 18   Ht 5' 4\" (1.626 m)   Wt 124 lb (56.2 kg)   LMP 03/02/2022 (Exact Date)   SpO2 96%   BMI 21.28 kg/m²     Physical Exam  Vitals and nursing note reviewed. Constitutional:       Appearance: She is well-developed. Comments:      Neck:      Vascular: No JVD. Cardiovascular:      Rate and Rhythm: Normal rate and regular rhythm. Heart sounds: No murmur heard. No friction rub. No gallop. Pulmonary:      Effort: Pulmonary effort is normal. No respiratory distress. Breath sounds: Normal breath sounds. No wheezing. Skin:     General: Skin is warm. Neurological:      Mental Status: She is alert and oriented to person, place, and time. ASSESSMENT and PLAN  Encounter Diagnoses   Name Primary?  Epigastric pain Yes    Gastroesophageal reflux disease without esophagitis     Intertrigo      Orders Placed This Encounter    REFERRAL TO GASTROENTEROLOGY    famotidine (PEPCID) 40 mg tablet    clotrimazole-betamethasone (LOTRISONE) topical cream    fluconazole (DIFLUCAN) 100 mg tablet     Given pepcid 40 bid and referral to GI  Given diflucan to use with the Lotrisone for the rash, reviewed triggers    I have discussed the diagnosis with the patient and the intended plan as seen in the above orders. The patient has received an after-visit summary and questions were answered concerning future plans.  Patient conveyed understanding of the plan at the time of the visit.     Ovidio Torres, MSN, ANP  3/3/2022

## 2022-03-03 NOTE — PROGRESS NOTES
Chief Complaint   Patient presents with    Gas     Pt c/o being \"gassy\" without improvement     Rash     Pt c/o intermittent rash that only appears if she sweats at night. Located between breast, under breast, and on the abd area. 1. Have you been to the ER, urgent care clinic since your last visit? Hospitalized since your last visit? No    2. Have you seen or consulted any other health care providers outside of the [address] [place] [address] since your last visit? Include any pap smears or colon screening.  No

## 2022-03-18 PROBLEM — F32.A DEPRESSION: Status: ACTIVE | Noted: 2021-10-21

## 2022-03-19 PROBLEM — R73.03 PREDIABETES: Status: ACTIVE | Noted: 2021-10-15

## 2022-03-19 PROBLEM — N92.6 MENSTRUAL IRREGULARITY: Status: ACTIVE | Noted: 2021-09-14

## 2022-03-19 PROBLEM — R42 VERTIGO: Status: ACTIVE | Noted: 2021-09-14

## 2022-03-20 PROBLEM — E55.9 HYPOVITAMINOSIS D: Status: ACTIVE | Noted: 2021-10-15

## 2022-03-23 ENCOUNTER — DOCUMENTATION ONLY (OUTPATIENT)
Dept: FAMILY MEDICINE CLINIC | Age: 42
End: 2022-03-23

## 2022-03-23 NOTE — PROGRESS NOTES
Faxed 03/03/22 office note & 10/12/21 lab results to [facility] per [facility] request Fax #[phone] confirmation received.

## 2022-05-09 LAB
CHLAMYDIA, EXTERNAL: NEGATIVE
N. GONORRHEA, EXTERNAL: NEGATIVE

## 2022-05-18 ENCOUNTER — HOSPITAL ENCOUNTER (EMERGENCY)
Age: 42
Discharge: HOME | End: 2022-05-18
Attending: EMERGENCY MEDICINE
Payer: MEDICAID

## 2022-05-18 ENCOUNTER — APPOINTMENT (OUTPATIENT)
Dept: ULTRASOUND IMAGING | Age: 42
End: 2022-05-18
Attending: EMERGENCY MEDICINE
Payer: MEDICAID

## 2022-05-18 VITALS
DIASTOLIC BLOOD PRESSURE: 54 MMHG | OXYGEN SATURATION: 100 % | SYSTOLIC BLOOD PRESSURE: 101 MMHG | HEIGHT: 63 IN | BODY MASS INDEX: 22.32 KG/M2 | TEMPERATURE: 98.5 F | HEART RATE: 65 BPM | WEIGHT: 126 LBS | RESPIRATION RATE: 20 BRPM

## 2022-05-18 DIAGNOSIS — O21.9 NAUSEA/VOMITING IN PREGNANCY: Primary | ICD-10-CM

## 2022-05-18 DIAGNOSIS — Z3A.01 5 WEEKS GESTATION OF PREGNANCY: ICD-10-CM

## 2022-05-18 LAB
ALBUMIN SERPL-MCNC: 4.2 G/DL (ref 3.5–5)
ALBUMIN/GLOB SERPL: 1.1 {RATIO} (ref 1.1–2.2)
ALP SERPL-CCNC: 60 U/L (ref 45–117)
ALT SERPL-CCNC: 19 U/L (ref 12–78)
ANION GAP SERPL CALC-SCNC: 14 MMOL/L (ref 5–15)
APPEARANCE UR: CLEAR
AST SERPL-CCNC: 12 U/L (ref 15–37)
BACTERIA URNS QL MICRO: NEGATIVE /HPF
BASOPHILS # BLD: 0.1 K/UL (ref 0–0.1)
BASOPHILS NFR BLD: 0 % (ref 0–1)
BILIRUB SERPL-MCNC: 0.3 MG/DL (ref 0.2–1)
BILIRUB UR QL: NEGATIVE
BUN SERPL-MCNC: 17 MG/DL (ref 6–20)
BUN/CREAT SERPL: 23 (ref 12–20)
CALCIUM SERPL-MCNC: 9.7 MG/DL (ref 8.5–10.1)
CHLORIDE SERPL-SCNC: 106 MMOL/L (ref 97–108)
CO2 SERPL-SCNC: 21 MMOL/L (ref 21–32)
COLOR UR: ABNORMAL
CREAT SERPL-MCNC: 0.74 MG/DL (ref 0.55–1.02)
DIFFERENTIAL METHOD BLD: ABNORMAL
EOSINOPHIL # BLD: 0 K/UL (ref 0–0.4)
EOSINOPHIL NFR BLD: 0 % (ref 0–7)
EPITH CASTS URNS QL MICRO: ABNORMAL /LPF
ERYTHROCYTE [DISTWIDTH] IN BLOOD BY AUTOMATED COUNT: 12.6 % (ref 11.5–14.5)
GLOBULIN SER CALC-MCNC: 3.7 G/DL (ref 2–4)
GLUCOSE SERPL-MCNC: 175 MG/DL (ref 65–100)
GLUCOSE UR STRIP.AUTO-MCNC: 100 MG/DL
HCG SERPL-ACNC: ABNORMAL MIU/ML (ref 0–6)
HCT VFR BLD AUTO: 40.5 % (ref 35–47)
HGB BLD-MCNC: 13.4 G/DL (ref 11.5–16)
HGB UR QL STRIP: NEGATIVE
HYALINE CASTS URNS QL MICRO: ABNORMAL /LPF (ref 0–2)
IMM GRANULOCYTES # BLD AUTO: 0.1 K/UL (ref 0–0.04)
IMM GRANULOCYTES NFR BLD AUTO: 1 % (ref 0–0.5)
KETONES UR QL STRIP.AUTO: 15 MG/DL
LEUKOCYTE ESTERASE UR QL STRIP.AUTO: NEGATIVE
LIPASE SERPL-CCNC: 158 U/L (ref 73–393)
LYMPHOCYTES # BLD: 1.6 K/UL (ref 0.8–3.5)
LYMPHOCYTES NFR BLD: 8 % (ref 12–49)
MAGNESIUM SERPL-MCNC: 2 MG/DL (ref 1.6–2.4)
MCH RBC QN AUTO: 29.4 PG (ref 26–34)
MCHC RBC AUTO-ENTMCNC: 33.1 G/DL (ref 30–36.5)
MCV RBC AUTO: 88.8 FL (ref 80–99)
MONOCYTES # BLD: 0.8 K/UL (ref 0–1)
MONOCYTES NFR BLD: 4 % (ref 5–13)
NEUTS SEG # BLD: 17.5 K/UL (ref 1.8–8)
NEUTS SEG NFR BLD: 87 % (ref 32–75)
NITRITE UR QL STRIP.AUTO: NEGATIVE
NRBC # BLD: 0 K/UL (ref 0–0.01)
NRBC BLD-RTO: 0 PER 100 WBC
PH UR STRIP: 7.5 [PH] (ref 5–8)
PLATELET # BLD AUTO: 227 K/UL (ref 150–400)
PMV BLD AUTO: 10.7 FL (ref 8.9–12.9)
POTASSIUM SERPL-SCNC: 3.7 MMOL/L (ref 3.5–5.1)
PROT SERPL-MCNC: 7.9 G/DL (ref 6.4–8.2)
PROT UR STRIP-MCNC: NEGATIVE MG/DL
RBC # BLD AUTO: 4.56 M/UL (ref 3.8–5.2)
RBC #/AREA URNS HPF: ABNORMAL /HPF (ref 0–5)
SODIUM SERPL-SCNC: 141 MMOL/L (ref 136–145)
SP GR UR REFRACTOMETRY: 1.01 (ref 1–1.03)
UR CULT HOLD, URHOLD: NORMAL
UROBILINOGEN UR QL STRIP.AUTO: 0.2 EU/DL (ref 0.2–1)
WBC # BLD AUTO: 20.1 K/UL (ref 3.6–11)
WBC URNS QL MICRO: ABNORMAL /HPF (ref 0–4)

## 2022-05-18 PROCEDURE — 84702 CHORIONIC GONADOTROPIN TEST: CPT

## 2022-05-18 PROCEDURE — 36415 COLL VENOUS BLD VENIPUNCTURE: CPT

## 2022-05-18 PROCEDURE — 81001 URINALYSIS AUTO W/SCOPE: CPT

## 2022-05-18 PROCEDURE — 74011250636 HC RX REV CODE- 250/636: Performed by: EMERGENCY MEDICINE

## 2022-05-18 PROCEDURE — 76801 OB US < 14 WKS SINGLE FETUS: CPT

## 2022-05-18 PROCEDURE — 96375 TX/PRO/DX INJ NEW DRUG ADDON: CPT

## 2022-05-18 PROCEDURE — 80053 COMPREHEN METABOLIC PANEL: CPT

## 2022-05-18 PROCEDURE — 85025 COMPLETE CBC W/AUTO DIFF WBC: CPT

## 2022-05-18 PROCEDURE — 96374 THER/PROPH/DIAG INJ IV PUSH: CPT

## 2022-05-18 PROCEDURE — 76817 TRANSVAGINAL US OBSTETRIC: CPT

## 2022-05-18 PROCEDURE — 83735 ASSAY OF MAGNESIUM: CPT

## 2022-05-18 PROCEDURE — 83690 ASSAY OF LIPASE: CPT

## 2022-05-18 PROCEDURE — 99284 EMERGENCY DEPT VISIT MOD MDM: CPT

## 2022-05-18 RX ORDER — ONDANSETRON 4 MG/1
4 TABLET, ORALLY DISINTEGRATING ORAL
Qty: 20 TABLET | Refills: 0 | Status: SHIPPED | OUTPATIENT
Start: 2022-05-18

## 2022-05-18 RX ORDER — METOCLOPRAMIDE HYDROCHLORIDE 5 MG/ML
10 INJECTION INTRAMUSCULAR; INTRAVENOUS
Status: COMPLETED | OUTPATIENT
Start: 2022-05-18 | End: 2022-05-18

## 2022-05-18 RX ORDER — DIPHENHYDRAMINE HYDROCHLORIDE 50 MG/ML
25 INJECTION, SOLUTION INTRAMUSCULAR; INTRAVENOUS
Status: COMPLETED | OUTPATIENT
Start: 2022-05-18 | End: 2022-05-18

## 2022-05-18 RX ORDER — ONDANSETRON 2 MG/ML
4 INJECTION INTRAMUSCULAR; INTRAVENOUS
Status: COMPLETED | OUTPATIENT
Start: 2022-05-18 | End: 2022-05-18

## 2022-05-18 RX ADMIN — METOCLOPRAMIDE 10 MG: 5 INJECTION, SOLUTION INTRAMUSCULAR; INTRAVENOUS at 20:11

## 2022-05-18 RX ADMIN — ONDANSETRON 4 MG: 2 INJECTION INTRAMUSCULAR; INTRAVENOUS at 21:42

## 2022-05-18 RX ADMIN — SODIUM CHLORIDE 1000 ML: 9 INJECTION, SOLUTION INTRAVENOUS at 21:42

## 2022-05-18 RX ADMIN — DIPHENHYDRAMINE HYDROCHLORIDE 25 MG: 50 INJECTION, SOLUTION INTRAMUSCULAR; INTRAVENOUS at 20:04

## 2022-05-18 RX ADMIN — SODIUM CHLORIDE 1000 ML: 9 INJECTION, SOLUTION INTRAVENOUS at 20:14

## 2022-05-18 NOTE — ED TRIAGE NOTES
Patient with vomiting, diarrhea, cold sweats and upper abdominal pain after eating lunch, is concerned for food poisoning. Patient is also approximately 10 weeks pregnant, unsure of last menstrual cycle.

## 2022-05-19 NOTE — ED PROVIDER NOTES
Please note that this dictation was completed with [employer], the [employer] voice recognition software.  Quite often unanticipated grammatical, syntax, homophones, and other interpretive errors are inadvertently transcribed by the computer software.  Please disregard these errors.  Please excuse any errors that have escaped final proofreading. Patient is a 60-year-old otherwise healthy female presenting to ED for evaluation of upper abdominal pain, nausea, vomiting, and diarrhea with onset today. She states that she has been nauseous for the past 2 days. Notes that she had takeout [employer] food prior to onset of her symptoms. She is about 10-12 weeks pregnant, is unsure of her last menstrual cycle, is scheduled for her first ultrasound next week. She denies fever, bloody emesis, bloody stool, dysuria, hematuria, vaginal discharge, vaginal bleeding, or any additional medical complaints at this time. She is [id number]. No past medical history on file. No past surgical history on file.       Family History:   Problem Relation Age of Onset    Diabetes Mother     Cancer Mother 58        Colon    Hypertension Father     Stroke Father         x's 2       Social History     Socioeconomic History    Marital status:      Spouse name: Not on file    Number of children: Not on file    Years of education: Not on file    Highest education level: Not on file   Occupational History    Not on file   Tobacco Use    Smoking status: Never Smoker    Smokeless tobacco: Never Used   Substance and Sexual Activity    Alcohol use: No    Drug use: No    Sexual activity: Yes   Other Topics Concern    Not on file   Social History Narrative    Not on file     Social Determinants of Health     Financial Resource Strain:     Difficulty of Paying Living Expenses: Not on file   Food Insecurity:     Worried About Running Out of Food in the Last Year: Not on file    Pam of Food in the Last Year: Not on file Transportation Needs:     Lack of Transportation (Medical): Not on file    Lack of Transportation (Non-Medical): Not on file   Physical Activity:     Days of Exercise per Week: Not on file    Minutes of Exercise per Session: Not on file   Stress:     Feeling of Stress : Not on file   Social Connections:     Frequency of Communication with Friends and Family: Not on file    Frequency of Social Gatherings with Friends and Family: Not on file    Attends Denominational Services: Not on file    Active Member of [address] [place] [address] or Organizations: Not on file    Attends Club or Organization Meetings: Not on file    Marital Status: Not on file   Intimate Partner Violence:     Fear of Current or Ex-Partner: Not on file    Emotionally Abused: Not on file    Physically Abused: Not on file    Sexually Abused: Not on file   Housing Stability:     Unable to Pay for Housing in the Last Year: Not on file    Number of [place] in the Last Year: Not on file    Unstable Housing in the Last Year: Not on file         ALLERGIES: Patient has no known allergies. Review of Systems   Constitutional: Negative for chills and fever. HENT: Negative for congestion, ear pain and sore throat. Eyes: Negative for visual disturbance. Respiratory: Negative for cough and shortness of breath. Cardiovascular: Negative for chest pain. Gastrointestinal: Positive for diarrhea, nausea and vomiting. Negative for abdominal pain and blood in stool. Genitourinary: Negative for dysuria and flank pain. Musculoskeletal: Negative for back pain. Skin: Negative for color change. Neurological: Negative for dizziness and headaches. Psychiatric/Behavioral: Negative for confusion. Vitals:    05/18/22 1859   BP: (!) 101/54   Pulse: 65   Resp: 20   Temp: 98.5 °F (36.9 °C)   SpO2: 100%   Weight: 57.2 kg (126 lb)   Height: 5' 3\" (1.6 m)            Physical Exam  Vitals and nursing note reviewed.    Constitutional:       General: She is not in acute distress. Appearance: Normal appearance. She is not ill-appearing. HENT:      Head: Normocephalic and atraumatic. Eyes:      General: Vision grossly intact. Extraocular Movements: Extraocular movements intact. Conjunctiva/sclera: Conjunctivae normal.   Neck:      Trachea: Phonation normal.   Cardiovascular:      Rate and Rhythm: Normal rate and regular rhythm. Heart sounds: Normal heart sounds. Pulmonary:      Effort: Pulmonary effort is normal.      Breath sounds: Normal breath sounds and air entry. Abdominal:      Palpations: Abdomen is soft. Tenderness: There is abdominal tenderness in the epigastric area and left upper quadrant. There is no guarding or rebound. Negative signs include Lagunas's sign and McBurney's sign. Musculoskeletal:         General: Normal range of motion. Skin:     General: Skin is warm and dry. Neurological:      General: No focal deficit present. Mental Status: She is alert and oriented to person, place, and time. Psychiatric:         Behavior: Behavior normal.          MDM  Number of Diagnoses or Management Options  5 weeks gestation of pregnancy  Nausea/vomiting in pregnancy  Diagnosis management comments: Patient is alert, afebrile, vital stable. [id number] female, about 10-12 weeks pregnant, has not confirmed IUP, presents with n/v/d. Slight abdominal tenderness in upper and left abd. Denies vaginal bleeding. Lab work with leukocytosis, otherwise unremarkable. Urine without bacteria. UA Pelvic ultrasounds showing viable IUP at 5 weeks/6 days. Pt feeling much better after fluids and IV antiemetics, tolerating po. Suspect possible food borne nature of sxs vs hyperemesis. Recommended prn zofran and OBGYN f/u. Return precautions outlined. All questions answered at this time. ED Course as of 05/18/22 2223   Wed May 18, 2022   2128 US PREG UTS < 14 WKS SNGL  IMPRESSION  Living, 5 week 6 day, IUP. Unremarkable adnexa.   No free fluid. Fibroid. [EP]      ED Course User Index  [EP] SANCHEZ hSin     10:29 PM  Pt has been reevaluated. There are no new complaints, changes, or physical findings at this time. All results have been reviewed with patient and/or family. Medications have been reviewed w/ pt and/or family. Pt and/or family's questions have been answered. Pt and/or family expressed good understanding of the dx/tx/rx and is in agreement with plan of care. Pt instructed and agreed to f/u w/ OBGYN and to return to ED upon further deterioration. Pt is ready for discharge. IMPRESSION:  1. Nausea/vomiting in pregnancy    2. 5 weeks gestation of pregnancy        PLAN:  1. Current Discharge Medication List      START taking these medications    Details   ondansetron (ZOFRAN ODT) 4 mg disintegrating tablet Take 1 Tablet by mouth every eight (8) hours as needed for Nausea or Vomiting. Qty: 20 Tablet, Refills: 0  Start date: 5/18/2022           2.    Follow-up Information     Follow up With Specialties Details Why Contact Info    Your OBGYN  Schedule an appointment as soon as possible for a visit               Return to ED if worse     Procedures

## 2022-06-06 LAB
ANTIBODY SCREEN, EXTERNAL: NEGATIVE
HBSAG, EXTERNAL: NEGATIVE
HIV, EXTERNAL: NEGATIVE
RPR, EXTERNAL: NORMAL
RUBELLA, EXTERNAL: NORMAL
TYPE, ABO & RH, EXTERNAL: NORMAL

## 2022-07-20 ENCOUNTER — VIRTUAL VISIT (OUTPATIENT)
Dept: FAMILY MEDICINE CLINIC | Age: 42
End: 2022-07-20
Payer: MEDICAID

## 2022-07-20 DIAGNOSIS — K21.9 GASTROESOPHAGEAL REFLUX DISEASE WITHOUT ESOPHAGITIS: ICD-10-CM

## 2022-07-20 DIAGNOSIS — R10.13 EPIGASTRIC PAIN: Primary | ICD-10-CM

## 2022-07-20 PROCEDURE — 99214 OFFICE O/P EST MOD 30 MIN: CPT | Performed by: NURSE PRACTITIONER

## 2022-07-20 RX ORDER — FAMOTIDINE 40 MG/1
40 TABLET, FILM COATED ORAL
Qty: 60 TABLET | Refills: 1 | Status: SHIPPED | OUTPATIENT
Start: 2022-07-20 | End: 2022-08-22

## 2022-07-20 RX ORDER — TOOTHBRUSH
1 EACH DENTAL DAILY
COMMUNITY
Start: 2022-07-11

## 2022-07-20 RX ORDER — PANTOPRAZOLE SODIUM 40 MG/1
40 TABLET, DELAYED RELEASE ORAL DAILY
COMMUNITY
Start: 2022-07-11

## 2022-07-20 NOTE — PROGRESS NOTES
Bentley Mead (: 1980) is a 39 y.o. female, established patient, here for evaluation of the following chief complaint(s):   GI Problem       ASSESSMENT/PLAN:  Below is the assessment and plan developed based on review of pertinent history, labs, studies, and medications. Diagnoses and all orders for this visit:    1. Epigastric pain    2. Gastroesophageal reflux disease without esophagitis    Other orders  -     famotidine (PEPCID) 40 mg tablet; Take 1 Tablet by mouth Before breakfast and dinner. Added the pepcid 40mg bid to her med regimen to see if would improve her gerd  Food triggers also reviewed    SUBJECTIVE/OBJECTIVE:  HPI  Patient is having severe reflux. She has seen GI however because she is 15 weeks pregnant they cannot do the scheduled endoscopy that was planned already  She is already on Protonix 40 mg a day however it is not doing a lot to help with her epigastric pain.   She has tried to decrease her spicy foods in her diet as well and cut back on dairy    Review of Systems     No data recorded     Physical Exam    [INSTRUCTIONS:  \"[x]\" Indicates a positive item  \"[]\" Indicates a negative item  -- DELETE ALL ITEMS NOT EXAMINED]    Constitutional: [x] Appears well-developed and well-nourished [x] No apparent distress      [] Abnormal -     Mental status: [x] Alert and awake  [x] Oriented to person/place/time [x] Able to follow commands    [] Abnormal -     Eyes:   EOM    [x]  Normal    [] Abnormal -   Sclera  [x]  Normal    [] Abnormal -          Discharge [x]  None visible   [] Abnormal -     HENT: [x] Normocephalic, atraumatic  [] Abnormal -   [x] Mouth/Throat: Mucous membranes are moist    External Ears [x] Normal  [] Abnormal -    Neck: [x] No visualized mass [] Abnormal -     Pulmonary/Chest: [x] Respiratory effort normal   [x] No visualized signs of difficulty breathing or respiratory distress        [] Abnormal -      Musculoskeletal:   [x] Normal gait with no signs of ataxia [x] Normal range of motion of neck        [] Abnormal -     Neurological:        [x] No Facial Asymmetry (Cranial nerve 7 motor function) (limited exam due to video visit)          [x] No gaze palsy        [] Abnormal -          Skin:        [x] No significant exanthematous lesions or discoloration noted on facial skin         [] Abnormal -            Psychiatric:       [x] Normal Affect [] Abnormal -        [x] No Hallucinations    Other pertinent observable physical exam findings:-    On this date 07/20/2022 I have spent 20 minutes reviewing previous notes, test results and face to face (virtual) with the patient discussing the diagnosis and importance of compliance with the treatment plan as well as documenting on the day of the visit. Derick Delarosa, was evaluated through a synchronous (real-time) audio-video encounter. The patient (or guardian if applicable) is aware that this is a billable service, which includes applicable co-pays. This Virtual Visit was conducted with patient's (and/or legal guardian's) consent. The visit was conducted pursuant to the emergency declaration under the [address] [place] [address] authority and the [employer] General Act. Patient identification was verified, and a caregiver was present when appropriate. The patient was located at: Home: [address] [place] [id number]  The provider was located at: Home: [unfilled]       An electronic signature was used to authenticate this note.   -- Kev Baez NP

## 2022-07-20 NOTE — PROGRESS NOTES
Chief Complaint   Patient presents with    GI Problem     Pt being seen for gi problems  -pt states that she went to see GI but she is not able to do the procedure as she is pregnant     1. Have you been to the ER, urgent care clinic since your last visit? Hospitalized since your last visit? No    2. Have you seen or consulted any other health care providers outside of the [address] [place] [address] since your last visit? Include any pap smears or colon screening.   GI    Pt has no other concerns

## 2022-08-22 RX ORDER — FAMOTIDINE 40 MG/1
40 TABLET, FILM COATED ORAL
Qty: 60 TABLET | Refills: 1 | Status: SHIPPED | OUTPATIENT
Start: 2022-08-22

## 2022-11-26 DIAGNOSIS — L30.4 INTERTRIGO: ICD-10-CM

## 2022-11-27 RX ORDER — CLOTRIMAZOLE AND BETAMETHASONE DIPROPIONATE 10; .64 MG/G; MG/G
CREAM TOPICAL
Qty: 45 G | Refills: 1 | Status: SHIPPED | OUTPATIENT
Start: 2022-11-27

## 2022-11-29 ENCOUNTER — HOSPITAL ENCOUNTER (OUTPATIENT)
Age: 42
Setting detail: OBSERVATION
Discharge: HOME | End: 2022-11-29
Attending: OBSTETRICS & GYNECOLOGY | Admitting: OBSTETRICS & GYNECOLOGY
Payer: MEDICAID

## 2022-11-29 VITALS
HEIGHT: 64 IN | OXYGEN SATURATION: 95 % | HEART RATE: 79 BPM | TEMPERATURE: 98.2 F | BODY MASS INDEX: 24.24 KG/M2 | DIASTOLIC BLOOD PRESSURE: 67 MMHG | SYSTOLIC BLOOD PRESSURE: 108 MMHG | RESPIRATION RATE: 14 BRPM | WEIGHT: 142 LBS

## 2022-11-29 PROBLEM — Z34.90 PREGNANCY: Status: ACTIVE | Noted: 2022-11-29

## 2022-11-29 LAB
APPEARANCE UR: CLEAR
BACTERIA URNS QL MICRO: NEGATIVE /HPF
BILIRUB UR QL: NEGATIVE
COLOR UR: ABNORMAL
EPITH CASTS URNS QL MICRO: ABNORMAL /LPF
FIBRONECTIN FETAL VAG QL: NEGATIVE
GLUCOSE UR STRIP.AUTO-MCNC: NEGATIVE MG/DL
HGB UR QL STRIP: NEGATIVE
HYALINE CASTS URNS QL MICRO: ABNORMAL /LPF (ref 0–2)
KETONES UR QL STRIP.AUTO: ABNORMAL MG/DL
LEUKOCYTE ESTERASE UR QL STRIP.AUTO: NEGATIVE
NITRITE UR QL STRIP.AUTO: NEGATIVE
PH UR STRIP: 6.5 [PH] (ref 5–8)
PROT UR STRIP-MCNC: NEGATIVE MG/DL
RBC #/AREA URNS HPF: ABNORMAL /HPF (ref 0–5)
SP GR UR REFRACTOMETRY: 1 (ref 1–1.03)
UA: UC IF INDICATED,UAUC: ABNORMAL
UROBILINOGEN UR QL STRIP.AUTO: 0.2 EU/DL (ref 0.2–1)
WBC URNS QL MICRO: ABNORMAL /HPF (ref 0–4)

## 2022-11-29 PROCEDURE — 75810000275 HC EMERGENCY DEPT VISIT NO LEVEL OF CARE

## 2022-11-29 PROCEDURE — 74011250636 HC RX REV CODE- 250/636: Performed by: OBSTETRICS & GYNECOLOGY

## 2022-11-29 PROCEDURE — 82731 ASSAY OF FETAL FIBRONECTIN: CPT

## 2022-11-29 PROCEDURE — 81001 URINALYSIS AUTO W/SCOPE: CPT

## 2022-11-29 PROCEDURE — G0378 HOSPITAL OBSERVATION PER HR: HCPCS

## 2022-11-29 RX ORDER — BUTORPHANOL TARTRATE 1 MG/ML
2 INJECTION INTRAMUSCULAR; INTRAVENOUS
Status: DISCONTINUED | OUTPATIENT
Start: 2022-11-29 | End: 2022-11-29 | Stop reason: HOSPADM

## 2022-11-29 RX ADMIN — SODIUM CHLORIDE, POTASSIUM CHLORIDE, SODIUM LACTATE AND CALCIUM CHLORIDE 1000 ML: 600; 310; 30; 20 INJECTION, SOLUTION INTRAVENOUS at 02:11

## 2022-11-29 RX ADMIN — BUTORPHANOL TARTRATE 2 MG: 1 INJECTION, SOLUTION INTRAMUSCULAR; INTRAVENOUS at 03:15

## 2022-11-29 NOTE — PROCEDURES
NST Procedure Note    Patient: Lucia Arcos               Sex: female          DOA: 11/29/2022       YOB: 1980      Age:  39 y.o.        LOS:  LOS: 0 days     MRN: [id number]                    CSN: [id number]      Estimated Gestational Age:33w5d     Indication for NST: Pre-Term labor evaluation    NST: 15x15    Fetal Vital Signs:  Mode: External  Fetal Heart Rate:125  Fetal Activity: Present  Variability: Moderate 6-25 bpm  Decelerations:No  Accelerations:Yes  FHR Interpretations:Category I    Non-Stress Test: obgyn fetal nst findings: reactive    Uterine Activity:  Mode: External  Frequency (min): q3-5 minutes   Duration (sec): 45 sec   Intensity: Moderate  Uterine Resting Tone: Relaxed    Signed by:Veronica Cobb MD  11/29/2022 3:06 AM

## 2022-11-29 NOTE — PROGRESS NOTES
5811: Pt arrived to L&D reporting \"painful kaylee vargas\" for the last few days that have gotten worse in the last few hours. Pt reports her contaction pain as a 7/10. Pt denies VB, leakage of fluid, and DFM. Pt denies any complications with her pregnancy. 0200: Dr. Vladislav Schofield notified of patient's arrival and situation. This RN instructed to insert an IV and start fluid bolus. 0315: Dr. Vladislav Schofield at bedside to perform SVE and collect FFN. Cervix closed at this time. UA collected. 0410: Dr. Vladislav Schofield notified of negative FFN and UA results. Pt to stay in observation. 0630: Pt able to rest comfortably after administration of stadol IV. RN instructed to discharge patient to home. 3922: Pt discharged to home. AVS given to patient and reviewed. Pt instructed to call her doctor if contractions return in frequency/intensity.

## 2022-11-29 NOTE — PROGRESS NOTES
Contractions significantly spaced. Pt comfortable.  Will discharge home to  in the office    Yumiko Singh MD  [facility] Hospitalist  [facility]

## 2022-11-29 NOTE — H&P
History & Physical    Name: Michael Santiago MRN: [id number]  SSN: [id number]    YOB: 1980  Age: 39 y.o. Sex: female        Subjective:     Estimated Date of Delivery: 23  OB History          8    Para   3    Term   3            AB   4    Living   3         SAB        IAB        Ectopic        Molar        Multiple        Live Births                    Ms. Rohini Man is admitted with pregnancy at 33w5d for  contractions . Prenatal course was complicated by advanced maternal age. Hx of CS with  x 2 all at term. Saw Dr. Geeta Ruggiero yesterday, was having contractions but refused cervical exam. Baby moving, No LOF or VB. Please see prenatal records for details. History reviewed. No pertinent past medical history. History reviewed. No pertinent surgical history. Social History     Occupational History    Not on file   Tobacco Use    Smoking status: Never    Smokeless tobacco: Never   Substance and Sexual Activity    Alcohol use: No    Drug use: No    Sexual activity: Yes     Family History   Problem Relation Age of Onset    Diabetes Mother     Cancer Mother 58        Colon    Hypertension Father     Stroke Father         x's 2       No Known Allergies  Prior to Admission medications    Medication Sig Start Date End Date Taking? Authorizing Provider   famotidine (PEPCID) 40 mg tablet TAKE 1 TABLET BY MOUTH BEFORE BREAKFAST AND DINNER 22  Yes Eliseo Sosa NP   pantoprazole (PROTONIX) 40 mg tablet Take 40 mg by mouth in the morning. 22  Yes Provider, Historical   Prenatal 28 mg iron- 800 mcg tab tablet Take 1 Tablet by mouth daily. 22  Yes Provider, Historical   clotrimazole-betamethasone (LOTRISONE) topical cream APPLY TO AFFECTED AREA TWO (2) TIMES DAILY AS NEEDED (FUNGAL RASH/IRRITATION).   Patient not taking: Reported on 2022   Eliseo Sosa NP   ondansetron (ZOFRAN ODT) 4 mg disintegrating tablet Take 1 Tablet by mouth every eight (8) hours as needed for Nausea or Vomiting. Patient not taking: No sig reported 5/18/22   Patricio Rosas PA        Review of Systems: A comprehensive review of systems was negative except for that written in the HPI. Objective:     Vitals:  Vitals:    11/29/22 0140 11/29/22 0151   BP: 112/74    Pulse: 90    Resp: 16    Temp: 98.2 °F (36.8 °C)    SpO2: 97%    Weight:  142 lb (64.4 kg)   Height:  5' 4\" (1.626 m)        Physical Exam:  Patient with distress. Heart: Regular rate and rhythm  Lung: clear to auscultation throughout lung fields, no wheezes, no rales, no rhonchi, and normal respiratory effort  Abdomen: soft, nontender  Fundus: soft and non tender  Perineum: blood absent, amniotic fluid absent  Cervical Exam: Closed/Thick/High  Membranes:  Intact  Fetal Heart Rate: Reactive, contractions every 3-5 minutes    Assessment/Plan:   33 5/7 with possible PTL, AMA, TOLAC  Plan: IVF, stadol. fFN sent.     Willis Stone MD  [facility] Hospitalist  [facility]      Signed By:  Willis Stone MD     November 29, 2022

## 2022-12-22 LAB — GRBS, EXTERNAL: POSITIVE

## 2023-01-04 ENCOUNTER — HOSPITAL ENCOUNTER (INPATIENT)
Age: 43
LOS: 2 days | Discharge: HOME | End: 2023-01-06
Attending: OBSTETRICS & GYNECOLOGY | Admitting: OBSTETRICS & GYNECOLOGY
Payer: MEDICAID

## 2023-01-04 LAB
BASOPHILS # BLD: 0 K/UL (ref 0–0.1)
BASOPHILS NFR BLD: 1 % (ref 0–1)
DIFFERENTIAL METHOD BLD: ABNORMAL
EOSINOPHIL # BLD: 0 K/UL (ref 0–0.4)
EOSINOPHIL NFR BLD: 1 % (ref 0–7)
ERYTHROCYTE [DISTWIDTH] IN BLOOD BY AUTOMATED COUNT: 13.9 % (ref 11.5–14.5)
HCT VFR BLD AUTO: 36.2 % (ref 35–47)
HGB BLD-MCNC: 12.1 G/DL (ref 11.5–16)
IMM GRANULOCYTES # BLD AUTO: 0.1 K/UL (ref 0–0.04)
IMM GRANULOCYTES NFR BLD AUTO: 1 % (ref 0–0.5)
LYMPHOCYTES # BLD: 1.7 K/UL (ref 0.8–3.5)
LYMPHOCYTES NFR BLD: 27 % (ref 12–49)
MCH RBC QN AUTO: 29.8 PG (ref 26–34)
MCHC RBC AUTO-ENTMCNC: 33.4 G/DL (ref 30–36.5)
MCV RBC AUTO: 89.2 FL (ref 80–99)
MONOCYTES # BLD: 0.4 K/UL (ref 0–1)
MONOCYTES NFR BLD: 7 % (ref 5–13)
NEUTS SEG # BLD: 4.1 K/UL (ref 1.8–8)
NEUTS SEG NFR BLD: 63 % (ref 32–75)
NRBC # BLD: 0 K/UL (ref 0–0.01)
NRBC BLD-RTO: 0 PER 100 WBC
PLATELET # BLD AUTO: 151 K/UL (ref 150–400)
PMV BLD AUTO: 11.4 FL (ref 8.9–12.9)
RBC # BLD AUTO: 4.06 M/UL (ref 3.8–5.2)
WBC # BLD AUTO: 6.4 K/UL (ref 3.6–11)

## 2023-01-04 PROCEDURE — 10907ZC DRAINAGE OF AMNIOTIC FLUID, THERAPEUTIC FROM PRODUCTS OF CONCEPTION, VIA NATURAL OR ARTIFICIAL OPENING: ICD-10-PCS | Performed by: OBSTETRICS & GYNECOLOGY

## 2023-01-04 PROCEDURE — 86900 BLOOD TYPING SEROLOGIC ABO: CPT

## 2023-01-04 PROCEDURE — 75410000002 HC LABOR FEE PER 1 HR: Performed by: OBSTETRICS & GYNECOLOGY

## 2023-01-04 PROCEDURE — 85025 COMPLETE CBC W/AUTO DIFF WBC: CPT

## 2023-01-04 PROCEDURE — 36415 COLL VENOUS BLD VENIPUNCTURE: CPT

## 2023-01-04 PROCEDURE — 77030028565 HC CATH CERV RIPNG BLN COOK -B

## 2023-01-04 PROCEDURE — 65270000029 HC RM PRIVATE

## 2023-01-04 PROCEDURE — 4A1HXCZ MONITORING OF PRODUCTS OF CONCEPTION, CARDIAC RATE, EXTERNAL APPROACH: ICD-10-PCS | Performed by: OBSTETRICS & GYNECOLOGY

## 2023-01-04 PROCEDURE — 59200 INSERT CERVICAL DILATOR: CPT | Performed by: OBSTETRICS & GYNECOLOGY

## 2023-01-05 ENCOUNTER — ANESTHESIA EVENT (OUTPATIENT)
Dept: LABOR AND DELIVERY | Age: 43
End: 2023-01-05
Payer: MEDICAID

## 2023-01-05 ENCOUNTER — ANESTHESIA (OUTPATIENT)
Dept: LABOR AND DELIVERY | Age: 43
End: 2023-01-05
Payer: MEDICAID

## 2023-01-05 PROBLEM — O34.211 MATERNAL CARE DUE TO LOW TRANSVERSE UTERINE SCAR FROM PREVIOUS CESAREAN DELIVERY: Status: ACTIVE | Noted: 2023-01-05

## 2023-01-05 PROBLEM — Z3A.39 39 WEEKS GESTATION OF PREGNANCY: Status: ACTIVE | Noted: 2023-01-05

## 2023-01-05 LAB
ABO + RH BLD: NORMAL
BLOOD GROUP ANTIBODIES SERPL: NORMAL
SPECIMEN EXP DATE BLD: NORMAL

## 2023-01-05 PROCEDURE — 65270000029 HC RM PRIVATE

## 2023-01-05 PROCEDURE — 74011250636 HC RX REV CODE- 250/636: Performed by: OBSTETRICS & GYNECOLOGY

## 2023-01-05 PROCEDURE — 74011000258 HC RX REV CODE- 258: Performed by: OBSTETRICS & GYNECOLOGY

## 2023-01-05 PROCEDURE — 74011000250 HC RX REV CODE- 250: Performed by: NURSE ANESTHETIST, CERTIFIED REGISTERED

## 2023-01-05 PROCEDURE — 76060000078 HC EPIDURAL ANESTHESIA: Performed by: NURSE ANESTHETIST, CERTIFIED REGISTERED

## 2023-01-05 PROCEDURE — 0KQM0ZZ REPAIR PERINEUM MUSCLE, OPEN APPROACH: ICD-10-PCS | Performed by: OBSTETRICS & GYNECOLOGY

## 2023-01-05 PROCEDURE — 77030014125 HC TY EPDRL BBMI -B: Performed by: ANESTHESIOLOGY

## 2023-01-05 PROCEDURE — 75410000002 HC LABOR FEE PER 1 HR: Performed by: OBSTETRICS & GYNECOLOGY

## 2023-01-05 PROCEDURE — 74011250637 HC RX REV CODE- 250/637: Performed by: OBSTETRICS & GYNECOLOGY

## 2023-01-05 PROCEDURE — 75410000003 HC RECOV DEL/VAG/CSECN EA 0.5 HR: Performed by: OBSTETRICS & GYNECOLOGY

## 2023-01-05 PROCEDURE — 74011250636 HC RX REV CODE- 250/636: Performed by: NURSE ANESTHETIST, CERTIFIED REGISTERED

## 2023-01-05 PROCEDURE — 75410000000 HC DELIVERY VAGINAL/SINGLE: Performed by: OBSTETRICS & GYNECOLOGY

## 2023-01-05 PROCEDURE — 36415 COLL VENOUS BLD VENIPUNCTURE: CPT

## 2023-01-05 RX ORDER — IBUPROFEN 800 MG/1
800 TABLET ORAL EVERY 8 HOURS
Status: DISCONTINUED | OUTPATIENT
Start: 2023-01-05 | End: 2023-01-06 | Stop reason: HOSPADM

## 2023-01-05 RX ORDER — ONDANSETRON 4 MG/1
4 TABLET, ORALLY DISINTEGRATING ORAL
Status: ACTIVE | OUTPATIENT
Start: 2023-01-05 | End: 2023-01-06

## 2023-01-05 RX ORDER — DIPHENHYDRAMINE HCL 25 MG
25 CAPSULE ORAL
Status: DISCONTINUED | OUTPATIENT
Start: 2023-01-05 | End: 2023-01-06 | Stop reason: HOSPADM

## 2023-01-05 RX ORDER — CALCIUM CARBONATE 200(500)MG
400 TABLET,CHEWABLE ORAL
Status: DISCONTINUED | OUTPATIENT
Start: 2023-01-05 | End: 2023-01-05 | Stop reason: HOSPADM

## 2023-01-05 RX ORDER — SIMETHICONE 80 MG
80 TABLET,CHEWABLE ORAL
Status: DISCONTINUED | OUTPATIENT
Start: 2023-01-05 | End: 2023-01-06 | Stop reason: HOSPADM

## 2023-01-05 RX ORDER — OXYTOCIN/RINGER'S LACTATE 30/500 ML
87.3 PLASTIC BAG, INJECTION (ML) INTRAVENOUS AS NEEDED
Status: DISCONTINUED | OUTPATIENT
Start: 2023-01-05 | End: 2023-01-06 | Stop reason: HOSPADM

## 2023-01-05 RX ORDER — OXYCODONE AND ACETAMINOPHEN 5; 325 MG/1; MG/1
1 TABLET ORAL
Status: DISCONTINUED | OUTPATIENT
Start: 2023-01-05 | End: 2023-01-06 | Stop reason: HOSPADM

## 2023-01-05 RX ORDER — EPHEDRINE SULFATE/0.9% NACL/PF 50 MG/5 ML
10 SYRINGE (ML) INTRAVENOUS
Status: DISCONTINUED | OUTPATIENT
Start: 2023-01-05 | End: 2023-01-05 | Stop reason: HOSPADM

## 2023-01-05 RX ORDER — SODIUM CHLORIDE, SODIUM LACTATE, POTASSIUM CHLORIDE, CALCIUM CHLORIDE 600; 310; 30; 20 MG/100ML; MG/100ML; MG/100ML; MG/100ML
125 INJECTION, SOLUTION INTRAVENOUS CONTINUOUS
Status: DISCONTINUED | OUTPATIENT
Start: 2023-01-05 | End: 2023-01-06 | Stop reason: HOSPADM

## 2023-01-05 RX ORDER — OXYTOCIN/RINGER'S LACTATE 30/500 ML
0-20 PLASTIC BAG, INJECTION (ML) INTRAVENOUS
Status: DISCONTINUED | OUTPATIENT
Start: 2023-01-05 | End: 2023-01-05 | Stop reason: SDUPTHER

## 2023-01-05 RX ORDER — OXYTOCIN/RINGER'S LACTATE 30/500 ML
10 PLASTIC BAG, INJECTION (ML) INTRAVENOUS AS NEEDED
Status: DISCONTINUED | OUTPATIENT
Start: 2023-01-05 | End: 2023-01-06 | Stop reason: HOSPADM

## 2023-01-05 RX ORDER — SODIUM CHLORIDE 0.9 % (FLUSH) 0.9 %
5-40 SYRINGE (ML) INJECTION AS NEEDED
Status: DISCONTINUED | OUTPATIENT
Start: 2023-01-05 | End: 2023-01-06 | Stop reason: HOSPADM

## 2023-01-05 RX ORDER — FENTANYL/BUPIVACAINE/NS/PF 2-1250MCG
10 PREFILLED PUMP RESERVOIR EPIDURAL CONTINUOUS
Status: DISCONTINUED | OUTPATIENT
Start: 2023-01-05 | End: 2023-01-05 | Stop reason: HOSPADM

## 2023-01-05 RX ORDER — LIDOCAINE HYDROCHLORIDE AND EPINEPHRINE 15; 5 MG/ML; UG/ML
INJECTION, SOLUTION EPIDURAL AS NEEDED
Status: DISCONTINUED | OUTPATIENT
Start: 2023-01-05 | End: 2023-01-05 | Stop reason: HOSPADM

## 2023-01-05 RX ORDER — ACETAMINOPHEN 325 MG/1
650 TABLET ORAL
Status: DISCONTINUED | OUTPATIENT
Start: 2023-01-05 | End: 2023-01-05 | Stop reason: HOSPADM

## 2023-01-05 RX ORDER — OXYTOCIN/RINGER'S LACTATE 30/500 ML
10 PLASTIC BAG, INJECTION (ML) INTRAVENOUS AS NEEDED
Status: COMPLETED | OUTPATIENT
Start: 2023-01-05 | End: 2023-01-05

## 2023-01-05 RX ORDER — OXYTOCIN/RINGER'S LACTATE 30/500 ML
87.3 PLASTIC BAG, INJECTION (ML) INTRAVENOUS AS NEEDED
Status: DISCONTINUED | OUTPATIENT
Start: 2023-01-05 | End: 2023-01-05 | Stop reason: SDUPTHER

## 2023-01-05 RX ORDER — LIDOCAINE HYDROCHLORIDE 10 MG/ML
20 INJECTION INFILTRATION; PERINEURAL ONCE
Status: ACTIVE | OUTPATIENT
Start: 2023-01-05 | End: 2023-01-05

## 2023-01-05 RX ORDER — SODIUM CHLORIDE 0.9 % (FLUSH) 0.9 %
5-40 SYRINGE (ML) INJECTION EVERY 8 HOURS
Status: DISCONTINUED | OUTPATIENT
Start: 2023-01-05 | End: 2023-01-06 | Stop reason: HOSPADM

## 2023-01-05 RX ORDER — MAG HYDROX/ALUMINUM HYD/SIMETH 200-200-20
30 SUSPENSION, ORAL (FINAL DOSE FORM) ORAL
Status: DISCONTINUED | OUTPATIENT
Start: 2023-01-05 | End: 2023-01-05 | Stop reason: HOSPADM

## 2023-01-05 RX ORDER — DOCUSATE SODIUM 100 MG/1
100 CAPSULE, LIQUID FILLED ORAL
Status: DISCONTINUED | OUTPATIENT
Start: 2023-01-05 | End: 2023-01-06 | Stop reason: HOSPADM

## 2023-01-05 RX ORDER — BUPIVACAINE HYDROCHLORIDE 2.5 MG/ML
INJECTION, SOLUTION EPIDURAL; INFILTRATION; INTRACAUDAL AS NEEDED
Status: DISCONTINUED | OUTPATIENT
Start: 2023-01-05 | End: 2023-01-05 | Stop reason: HOSPADM

## 2023-01-05 RX ORDER — NALOXONE HYDROCHLORIDE 0.4 MG/ML
0.4 INJECTION, SOLUTION INTRAMUSCULAR; INTRAVENOUS; SUBCUTANEOUS AS NEEDED
Status: DISCONTINUED | OUTPATIENT
Start: 2023-01-05 | End: 2023-01-06 | Stop reason: HOSPADM

## 2023-01-05 RX ORDER — MINERAL OIL
120 OIL (ML) ORAL ONCE
Status: ACTIVE | OUTPATIENT
Start: 2023-01-05 | End: 2023-01-05

## 2023-01-05 RX ORDER — ONDANSETRON 2 MG/ML
4 INJECTION INTRAMUSCULAR; INTRAVENOUS
Status: DISCONTINUED | OUTPATIENT
Start: 2023-01-05 | End: 2023-01-05 | Stop reason: HOSPADM

## 2023-01-05 RX ADMIN — SODIUM CHLORIDE 2.5 MILLION UNITS: 9 INJECTION, SOLUTION INTRAVENOUS at 13:05

## 2023-01-05 RX ADMIN — Medication 1 MILLI-UNITS/MIN: at 12:00

## 2023-01-05 RX ADMIN — SODIUM CHLORIDE 2.5 MILLION UNITS: 9 INJECTION, SOLUTION INTRAVENOUS at 09:04

## 2023-01-05 RX ADMIN — BUPIVACAINE HYDROCHLORIDE 3 ML: 2.5 INJECTION, SOLUTION EPIDURAL; INFILTRATION; INTRACAUDAL; PERINEURAL at 10:26

## 2023-01-05 RX ADMIN — Medication 10 ML/HR: at 11:15

## 2023-01-05 RX ADMIN — Medication 10000 MILLI-UNITS: at 14:46

## 2023-01-05 RX ADMIN — SODIUM CHLORIDE, POTASSIUM CHLORIDE, SODIUM LACTATE AND CALCIUM CHLORIDE 1000 ML: 600; 310; 30; 20 INJECTION, SOLUTION INTRAVENOUS at 11:29

## 2023-01-05 RX ADMIN — SODIUM CHLORIDE 5 MILLION UNITS: 900 INJECTION INTRAVENOUS at 01:54

## 2023-01-05 RX ADMIN — SODIUM CHLORIDE 2.5 MILLION UNITS: 9 INJECTION, SOLUTION INTRAVENOUS at 05:34

## 2023-01-05 RX ADMIN — IBUPROFEN 800 MG: 800 TABLET, FILM COATED ORAL at 15:58

## 2023-01-05 RX ADMIN — BUPIVACAINE HYDROCHLORIDE 5 ML: 2.5 INJECTION, SOLUTION EPIDURAL; INFILTRATION; INTRACAUDAL; PERINEURAL at 10:22

## 2023-01-05 RX ADMIN — SODIUM CHLORIDE, POTASSIUM CHLORIDE, SODIUM LACTATE AND CALCIUM CHLORIDE 125 ML/HR: 600; 310; 30; 20 INJECTION, SOLUTION INTRAVENOUS at 13:05

## 2023-01-05 RX ADMIN — LIDOCAINE HYDROCHLORIDE AND EPINEPHRINE 3 ML: 15; 5 INJECTION, SOLUTION EPIDURAL at 10:18

## 2023-01-05 NOTE — ANESTHESIA PREPROCEDURE EVALUATION
Relevant Problems   No relevant active problems       Anesthetic History   No history of anesthetic complications            Review of Systems / Medical History  Patient summary reviewed, nursing notes reviewed and pertinent labs reviewed    Pulmonary  Within defined limits                 Neuro/Psych   Within defined limits           Cardiovascular  Within defined limits                     GI/Hepatic/Renal                Endo/Other  Within defined limits           Other Findings   Comments:  @39wks TOLAC           Physical Exam    Airway  Mallampati: II  TM Distance: 4 - 6 cm  Neck ROM: normal range of motion   Mouth opening: Normal     Cardiovascular  Regular rate and rhythm,  S1 and S2 normal,  no murmur, click, rub, or gallop  Rhythm: regular  Rate: normal         Dental  No notable dental hx       Pulmonary  Breath sounds clear to auscultation               Abdominal  Abdominal exam normal       Other Findings            Anesthetic Plan    ASA: 2  Anesthesia type: epidural            Anesthetic plan and risks discussed with: Patient

## 2023-01-05 NOTE — DISCHARGE SUMMARY
Obstetrical Discharge Summary     Name: Rachael Barrett MRN: [id number]  SSN: [id number]    YOB: 1980  Age: 43 y.o. Sex: female      Admit Date: 2023    Discharge Date: 2023     Attending Physician:  Fabricio Nelson MD     Delivering Physician:  Geovanna Corral MD     * Admission Diagnoses:   IUP @ 39w0d  Elective IOL  TOLAC      * Discharge Diagnoses:   Delivery of a VMI via  by Neva Jones MD on 2023. Apgars were 9 and 9.      2nd degree laceration      Additional Diagnoses:   Hospital Problems as of 2023 Date Reviewed: 2023            Codes Class Noted - Resolved POA    Streptococcus B carrier state complicating childbirth NUP-32-LG: D04.689  ICD-9-CM: 648.91, V02.51  2023 - Present Yes        39 weeks gestation of pregnancy ICD-10-CM: Z3A.39  ICD-9-CM: V22.2  2023 - Present Yes        Maternal care due to low transverse uterine scar from previous  delivery ICD-10-CM: O34.211  ICD-9-CM: 654.20  2023 - Present Yes          Lab Results   Component Value Date/Time    Rubella, External immune 2022 12:00 AM    GrBStrep, External positive 2022 12:00 AM      Immunization History   Administered Date(s) Administered    COVID-19, MODERNA BLUE border, Primary or Immunocompromised, (age 18y+), IM, 100 mcg/0.5mL 2021    TDAP Vaccine 2011       * Procedures:            * Discharge Condition: good    * Hospital Course: Normal hospital course following the delivery. * Disposition: Home    Discharge Medications:   Current Discharge Medication List          * Follow-up Care/Patient Instructions:   Activity: Activity as tolerated  Diet: Regular Diet  Wound Care: As directed      Followup 6 weeks for PP check        Signed By:  Ricardo Oleary MD     2023

## 2023-01-05 NOTE — PROGRESS NOTES
1/5/2023  11:43 AM    CM met with AGUILAR to complete initial assessment and begin discharge planning. MOB verified and confirmed demographics. AGUILAR lives with MICK Camarena ( [phone]),  at the address on file. AGUILAR reports she has good family support, and feels like she has the support she needs when she returns home. AGUILAR plans to breast feed baby and has pump to use at home. AGUILAR has car seat, bassinet/crib, clothing, bottles and all necessary supplies for baby. AGUILAR has [employer], and will be adding baby to this policy. CM discussed process to add baby to insurance, MOB verbalized understanding. Care Management Interventions  PCP Verified by CM: Yes ([facility])  Mode of Transport at Discharge:  Other (see comment)  Transition of Care Consult (CM Consult): Discharge Planning  Support Systems: Spouse/Significant Other, Other Family Member(s)  Confirm Follow Up Transport: Family  Discharge Location  Patient Expects to be Discharged to[de-identified] Home with family assistance  Onelia Meyer

## 2023-01-05 NOTE — ANESTHESIA PROCEDURE NOTES
Epidural Block    Patient location during procedure: OB  Start time: 1/5/2023 10:08 AM  End time: 1/5/2023 10:18 AM  Reason for block: labor epidural  Staffing  Performed: CRNA   Anesthesiologist: Sariah Joy MD  Resident/CRNA: Shonda Tucker CRNA  Preanesthetic Checklist  Completed: patient identified, IV checked, site marked, risks and benefits discussed, surgical consent, monitors and equipment checked, pre-op evaluation, timeout performed and fire risk safety assessment completed and verbalized  Block Placement  Patient position: sitting  Prep: ChloraPrep  Sterility prep: cap, drape, gloves, hand and mask  Sedation level: no sedation  Patient monitoring: continuous pulse oximetry, frequent blood pressure checks and heart rate  Approach: midline  Location: lumbar  Lumbar location: L3-L4  Epidural  Loss of resistance technique: saline  Guidance: landmark technique  Needle  Needle type: Tuohy   Needle gauge: 17 G  Needle length: 9 cm  Needle insertion depth: 4.5 cm  Catheter type: multi-orifice  Catheter size: 20 G  Catheter at skin depth: 10 cm  Catheter securement method: clear occlusive dressing, liquid medical adhesive, stabilization device and surgical tape  Test dose: negative  Assessment  Block outcome: pain improved  Number of attempts: 2  Events: none  Procedure assessment: patient tolerated procedure well with no immediate complications

## 2023-01-05 NOTE — H&P
History & Physical    Name: Chris Urias MRN: [id number]  SSN: [id number]    YOB: 1980  Age: 43 y.o. Sex: female        Subjective:   Chief Complaint: I'm here for induction of labor  Estimated Date of Delivery: 23  OB History    Para Term  AB Living   8 3 3   4 3   SAB IAB Ectopic Molar Multiple Live Births             3      # Outcome Date GA Lbr Adolfo/2nd Weight Sex Delivery Anes PTL Lv   8 Current            7 AB            6 AB            5 AB            4 AB            3 Term      Vag-Spont   JOHNNIE   2 Term      Vag-Spont   JOHNNIE   1 Term      CS-LTranv   JOHNNIE       Negin Subramanian, 43 y.o.,  G8 ,  presents at 39w0d, complaining of I'm here for induction of labor. She is here for induction of labor at 39 weeks. She's had a prior , and two successful 's. She desires another. She knows there is a risk of uterine rupture. Prenatal course was normal. Please see prenatal records for details. No Known Allergies    Prior to Admission medications    Medication Sig Start Date End Date Taking? Authorizing Provider   famotidine (PEPCID) 40 mg tablet TAKE 1 TABLET BY MOUTH BEFORE BREAKFAST AND DINNER 22  Yes Mikal Rodarte NP   pantoprazole (PROTONIX) 40 mg tablet Take 40 mg by mouth in the morning. 22  Yes Provider, Historical   Prenatal 28 mg iron- 800 mcg tab tablet Take 1 Tablet by mouth daily.  22  Yes Provider, Historical       Past Medical History:   Diagnosis Date    Psychiatric problem        Past Surgical History:   Procedure Laterality Date    HX  SECTION         Social History     Occupational History    Not on file   Tobacco Use    Smoking status: Never    Smokeless tobacco: Never   Substance and Sexual Activity    Alcohol use: No    Drug use: No    Sexual activity: Yes       Family History   Problem Relation Age of Onset    Diabetes Mother     Cancer Mother 58        Colon    Hypertension Father     Stroke Father         x's 2 Review of Systems   All other systems reviewed and are negative. Objective:     Physical Exam:    Visit Vitals  /63 (BP 1 Location: Left upper arm, BP Patient Position: At rest)   Pulse 80   Resp 16   Ht 5' 3\" (1.6 m)   Wt 66.7 kg (147 lb)   SpO2 97%   BMI 26.04 kg/m²       General:   43 y.o.  female who appears to be in no acute distress. HEENT:   Normocephalic. Pupils are equal, round, and reactive to light. Extraocular movements are intact. Pharynx is clear. Neck:   Normal range of motion. No thyromegaly. Heart:   Regular rate and rhythm. No murmurs present. Lungs:   Clear, no rales, rhonchi, or wheezes. Abdomen:   Soft, gravid, not tender, normal bowel sounds. No CVA tenderness   Leopold's: vertex   EFW 7 pounds   Uterine Activity:    Frequency: None  Fetal Heart Rate:     Baseline: 130 bpm    Variability: Moderate    Accelerations: Present (15 x 15 bpm)    Decelerations: None  Category: 1  Pelvic:    Membranes: Intact   Cervical Exam:     Position Posterior    Condition Soft  Presentation Vertex  Dilation 1 cms    Effacement 50 %    Station -2    Cook Balloon placed with 80 mls/ 80 mls   Pelvis is adequate for vaginal delivery. Extremites:   Trace bilateral pedal edema. Full range of motion. Neuro:    Alert. Oriented. CN 2 - 12 intact.   Motor and sensory exam grossly normal.      Prenatal Labs   Lab Results   Component Value Date/Time    ABO/Rh(D) O POSITIVE 2023 08:35 PM    Rubella, External immune 2022 12:00 AM    GrBStrep, External positive 2022 12:00 AM    HBsAg, External negative 2022 12:00 AM    HIV, External negative 2022 12:00 AM    RPR, External non-reactive 2022 12:00 AM    Gonorrhea, External negative 2022 12:00 AM    Chlamydia, External negative 2022 12:00 AM    ABO,Rh O positive 2022 12:00 AM     Recent Results (from the past 12 hour(s))   CBC WITH AUTOMATED DIFF    Collection Time: 23  8:35 PM Result Value Ref Range    WBC 6.4 3.6 - 11.0 K/uL    RBC 4.06 3.80 - 5.20 M/uL    HGB 12.1 11.5 - 16.0 g/dL    HCT 36.2 35.0 - 47.0 %    MCV 89.2 80.0 - 99.0 FL    MCH 29.8 26.0 - 34.0 PG    MCHC 33.4 30.0 - 36.5 g/dL    RDW 13.9 11.5 - 14.5 %    PLATELET 332 686 - 408 K/uL    MPV 11.4 8.9 - 12.9 FL    NRBC 0.0 0  WBC    ABSOLUTE NRBC 0.00 0.00 - 0.01 K/uL    NEUTROPHILS 63 32 - 75 %    LYMPHOCYTES 27 12 - 49 %    MONOCYTES 7 5 - 13 %    EOSINOPHILS 1 0 - 7 %    BASOPHILS 1 0 - 1 %    IMMATURE GRANULOCYTES 1 (H) 0.0 - 0.5 %    ABS. NEUTROPHILS 4.1 1.8 - 8.0 K/UL    ABS. LYMPHOCYTES 1.7 0.8 - 3.5 K/UL    ABS. MONOCYTES 0.4 0.0 - 1.0 K/UL    ABS. EOSINOPHILS 0.0 0.0 - 0.4 K/UL    ABS. BASOPHILS 0.0 0.0 - 0.1 K/UL    ABS. IMM.  GRANS. 0.1 (H) 0.00 - 0.04 K/UL    DF AUTOMATED     TYPE & SCREEN    Collection Time: 23  8:35 PM   Result Value Ref Range    Crossmatch Expiration 2023,2359     ABO/Rh(D) O POSITIVE     Antibody screen NEG          Assessment/Plan:     Active Hospital Problems    Diagnosis Date Noted    Streptococcus B carrier state complicating childbirth      Priority: 1 - One    Maternal care due to low transverse uterine scar from previous  delivery 2023     Priority: 2 - Two    39 weeks gestation of pregnancy 2023     Priority: 3 - Three       Maternal care due to low transverse uterine scar from previous  delivery  Start induction with balloon  Start oxytocin at 0600      Streptococcus B carrier state complicating childbirth  Otf Chávez MD  2023

## 2023-01-05 NOTE — DISCHARGE INSTRUCTIONS
Discharge Instructions for Vaginal Delivery    Patient ID:  Hiro Calero  [id number]  40 y.o.  1980    Take Home Medications       Continue taking your prenatal vitamins if you are breastfeeding. Follow-up care is a key part of your treatment and safety. Please schedule and keep appointments. Follow-up with your primary OB in 6 weeks. Activity  Avoid anything in your vagina for 6 weeks (no intercourse, tampons, or douching). You may drive unless you are taking prescription pain medications. Climbing stairs and light lifting are okay. Please avoid excessive exercise, though walking is okay- you'll be tired! Diet  Regular diet as tolerated. Be sure to drink plenty of fluids if you are breastfeeding. Wound care  If you have stitches, continue to rinse with a squirt bottle of warm water each time you void for about 7-10 days. .  Your stitches will gradually dissolve over four to eight weeks. Sitz baths are also helpful to keep the wound clean, encourage healing, and to help with pain associated with the stitches or hemorrhoids. You can use either a sitz bath basin or a bathtub filled with 2-3\" inches of plain warm water. Soak for 10 minutes 3 times a day as tolerated. Pain Management  Over the counter medications such as Tylenol and ibuprofen (Motrin or Advil) are ideal.  These may be taken together, alternating doses. You may  take the maximum dose:  Motrin or Advil (generic ibuprofen), either 3 tablets every 6 hours or 4 tablets every 8 hours or Tylenol (acetominophen) 1000mg every 6 hours (equivalent to 2 extra strength Tylenol). You may also have a precrescription for stronger pain medication. Take only as needed and transition to over the counter medication in the next few days. Minimize amounts of the prescription medication, as it can be habit-forming and will worsen or cause constipation.  Most patients will find that within a couple of days, their pain is adequately controlled using only over-the-counter medications. The prescription pain medication is mixed with Tylenol, therefore, you should not take any extra Tylenol or acetaminophen until you have reduced your prescription pain medication. Add heating pad or sitz baths as needed. Add hemorrhoid wipes or ointments if needed    Constipation  Constipation is normal after pregnancy and delivery, especially while taking prescription narcotic pain medication. Over the counter remedies including ducosate (Colace), take 1-2 capsules 1-2 times daily for soft stool as needed. You may also add/ try milk of magnesia or rectal remedies such as Dulcolax or Fleets enema. Recovery: What to Expect at Home  Fatigue is expected. Try to rest when you can and don't worry about doing housework or other tasks which can wait. The soreness along your bottom will improve significantly over the first 2 weeks, but it may take 6 weeks before you are completely recovered. Back pain or general body aches or muscle soreness are expected and should improve with acetominophen or ibuprofen. Leg swelling due to pregnancy and/or IV fluids given in the hospital will take about two weeks to resolve. Most women experience some form of the \"Baby Blues\" after having a baby. Feeling emotional, tearful, frustrated, anxious, sad, and irritable some of the time is normal and go away after about 2 weeks. Adequate rest and help from your family will help. Take breaks from caring for the baby. Call your doctor if your symptoms seem severe, last more than 2 weeks, or seem to be getting worse instead of better. Get help immediately if you have thoughts of wanting to hurt yourself or others! Call your doctor or seek immediate medical care if you have:  Heavy vaginal bleeding, soaking through one or more pads an hour for several hours. Foul-smelling discharge from your vagina or incision. Consistent nausea and vomiting and cannot keep fluids down.   Consistent pain that does not get better after you take pain medicine.   Sudden chest pain and shortness of breath  Signs of a blood clot: pain/ swelling/ increasing redness in your lower extremeties  Signs of infection: increased pain in your abdomen or vaginal area; red streaks, warmth, or tenderness of your breasts; fever of 100.5 F or greater

## 2023-01-05 NOTE — PROGRESS NOTES
Patient seen.  Comfortable with epidural.     Cervix 8/80/-1  FSE placed for monitoring    Recheck 2hrs    Isiah Blanc MD  [facility]

## 2023-01-05 NOTE — PROGRESS NOTES
4045 Bedside SBAR rec'd from [facility]. 7251 Dr. Robin Stager at bedside to remove tan bulb by tugging and perform SVE 7/50/-2.  0810 AROM by Dr. Sharda Peguero. Patient tolerated well. [address] CRNA at bedside for epidural placement. Patient positioned appropriately. 1008 Time-out performed. 1018 Test dose. 1022 Bolus dose. 1023 Patient laid flat. Procedure tolerated well by patient. 36 Dr. Robin Stager at bedside for SVE 8/80/-1. FSE applied. 1612-7003 Decel noted; patient turned to left lateral and IV fluid bolus given. Will continue to monitor. 1425 Patient actively pushing. RN remains in continuous attendance at the bedside. Assessment & evaluation of fetal heart rate ongoing via continuous EFM. [address] [place] remained at bedside throughout pushing. EFM continuously assessed. Vaginal delivery of viable infant. [id number] Patient taken to . Bedside report given to receiving RN.

## 2023-01-05 NOTE — PROGRESS NOTES
Having some pressure with contractions. Rechecked and cervix unchanged. Pitocin had just been started at 1. Given orders to increase pit to 4 and increase per protocol. Recheck in 2hrs or prn.     Alexandrea Orlando MD  [facility]

## 2023-01-05 NOTE — PROGRESS NOTES
Labor Note    Ruiz Covert  [id number]  1980   39w0d      S:  Feeling comfortable with tan in place. O:    Visit Vitals  BP (!) 87/54 (BP 1 Location: Left upper arm, BP Patient Position: At rest)   Pulse 83   Temp 98.3 °F (36.8 °C)   Resp 16   Ht 5' 3\" (1.6 m)   Wt 66.7 kg (147 lb)   LMP  (LMP Unknown)   SpO2 98%   BMI 26.04 kg/m²     Cervix 7/50/-2  Tan removed  AROM of clear fluid    Patient Vitals for the past 4 hrs: Mode Fetal Heart Rate Variability Decelerations Accelerations RN Reviewed Strip?   23 0735 External 135 6-25 BPM None No Yes   23 0630 External 125 6-25 BPM None Yes Yes   23 0600 External 135 6-25 BPM None Yes Yes       A/P:  43 y.o.  @ 39w0d- IOL, TOLAC   1. CEFM/[place]  2. GBS pos / Rhpos  3. Pitocin as needed  4. Pain control - epidural as needed  5. Jovanny 2-4 hours, or prn. Expect .       Bobbi Amaya MD  [facility]

## 2023-01-05 NOTE — PROGRESS NOTES
1952: Pt arrived to room 203 for scheduled induction with partner. Pt denies any LOF or vaginal bleeding and endorses +FM. EFM placed on pt at this time. 2023: NST verified reactive with HOA Dickey. EFM taken off pt at this time. 2356: EFM applied to pt. SVE by Dr Mac Asencio. 1/50/-2.     0000: FB placed by Dr Mac Asencio. 80/80 ml.     0108: NST verified reactive with PEEWEE Luna RN. EFM taken off pt at this time. 0532: EFM applied to pt.     0715: Bedside and Verbal shift change report given to German (oncoming nurse) by Lynne Thurston RN (offgoing nurse). Report included the following information SBAR, Kardex, Procedure Summary, Intake/Output, MAR, and Recent Results.

## 2023-01-05 NOTE — L&D DELIVERY NOTE
Delivery Note:     Patient reached FD and pushed with good effort to deliver the fetal head in OSWALDO position. Single nuchal cord found and delivered through. The anterior shoulder, followed by the posterior shoulder and the rest of the body then delivered easily. This was a LBMI with Apgars of 9 and 9 at 1 and 5 minutes respectively, weight pending. The infant was placed on mom's abdomen. The cord was then double clamped and cut by the FOB. Cord blood was taken. The placenta followed spontaneously, intact, with 3VC. Pitocin was added to the IVF and the fundus was firm to palpation. The vagina, cervix and perineum were examined and 2nd degree laceration was found and repaired with a 3-0 vicryl in a running fashion.  mL. No complications. Mom and baby doing well. Dr. Bourne Dire delivering.      Ozzie Bejarano MD  [facility]

## 2023-01-06 VITALS
HEART RATE: 80 BPM | WEIGHT: 147 LBS | OXYGEN SATURATION: 97 % | RESPIRATION RATE: 16 BRPM | SYSTOLIC BLOOD PRESSURE: 94 MMHG | DIASTOLIC BLOOD PRESSURE: 54 MMHG | TEMPERATURE: 99 F | HEIGHT: 63 IN | BODY MASS INDEX: 26.05 KG/M2

## 2023-01-06 PROCEDURE — 74011250637 HC RX REV CODE- 250/637: Performed by: OBSTETRICS & GYNECOLOGY

## 2023-01-06 RX ADMIN — IBUPROFEN 800 MG: 800 TABLET, FILM COATED ORAL at 11:10

## 2023-01-06 RX ADMIN — IBUPROFEN 800 MG: 800 TABLET, FILM COATED ORAL at 01:52

## 2023-01-06 NOTE — PROGRESS NOTES
PostPartum Note    Toney Goodell  [id number]  1980  43 y.o.    S:  Ms. Toney Goodell is a 43 y.o. Sapna Lara PPD #1 s/p  @ 39w0d. Doing well. She had a baby boy. Her lochia is like a period. She describes her pain as mild and is well controlled with PO medications. She is ambulating and voiding. Tolerating PO intake. O:   Visit Vitals  BP (!) 92/51 (BP 1 Location: Left upper arm, BP Patient Position: At rest) Comment: RN notified   Pulse 78   Temp 99.1 °F (37.3 °C)   Resp 16   Ht 5' 3\" (1.6 m)   Wt 66.7 kg (147 lb)   LMP  (LMP Unknown)   SpO2 98%   Breastfeeding Unknown   BMI 26.04 kg/m²       Lab Results   Component Value Date/Time    WBC 6.4 2023 08:35 PM    HGB 12.1 2023 08:35 PM    HCT 36.2 2023 08:35 PM    PLATELET 926 15/52/3429 08:35 PM    MCV 89.2 2023 08:35 PM       Gen - No acute distress  Abdomen - Fundus firm, below the umbilicus   Ext - Warm, well perfused. Nontender    A/P:  PPD #1 s/p  @ 39w0d doing well. 1.  Routine PP instructions/ care discussed  2. Blood type - Rh positive  3. 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 Discharge today   5. F/U 4-6 weeks for PP check.       Kellee Carrasquillo MD  [facility]

## 2023-01-12 RX ORDER — FAMOTIDINE 40 MG/1
40 TABLET, FILM COATED ORAL
Qty: 60 TABLET | Refills: 1 | Status: SHIPPED | OUTPATIENT
Start: 2023-01-12

## 2023-03-20 RX ORDER — FAMOTIDINE 40 MG/1
40 TABLET, FILM COATED ORAL
Qty: 60 TABLET | Refills: 1 | Status: SHIPPED | OUTPATIENT
Start: 2023-03-20

## 2023-07-27 RX ORDER — FAMOTIDINE 40 MG/1
TABLET, FILM COATED ORAL
Qty: 60 TABLET | Refills: 1 | Status: SHIPPED | OUTPATIENT
Start: 2023-07-27

## 2023-08-22 ENCOUNTER — TELEMEDICINE (OUTPATIENT)
Age: 43
End: 2023-08-22
Payer: MEDICAID

## 2023-08-22 DIAGNOSIS — K59.04 CHRONIC IDIOPATHIC CONSTIPATION: Primary | ICD-10-CM

## 2023-08-22 PROCEDURE — 99213 OFFICE O/P EST LOW 20 MIN: CPT | Performed by: FAMILY MEDICINE

## 2023-08-22 RX ORDER — PLECANATIDE 3 MG/1
3 TABLET ORAL DAILY
Qty: 30 TABLET | Refills: 2 | Status: SHIPPED | OUTPATIENT
Start: 2023-08-22

## 2023-08-22 RX ORDER — POLYETHYLENE GLYCOL 3350 17 G/17G
17 POWDER, FOR SOLUTION ORAL DAILY PRN
COMMUNITY

## 2023-08-22 SDOH — ECONOMIC STABILITY: FOOD INSECURITY: WITHIN THE PAST 12 MONTHS, THE FOOD YOU BOUGHT JUST DIDN'T LAST AND YOU DIDN'T HAVE MONEY TO GET MORE.: NEVER TRUE

## 2023-08-22 SDOH — ECONOMIC STABILITY: INCOME INSECURITY: HOW HARD IS IT FOR YOU TO PAY FOR THE VERY BASICS LIKE FOOD, HOUSING, MEDICAL CARE, AND HEATING?: NOT HARD AT ALL

## 2023-08-22 SDOH — ECONOMIC STABILITY: FOOD INSECURITY: WITHIN THE PAST 12 MONTHS, YOU WORRIED THAT YOUR FOOD WOULD RUN OUT BEFORE YOU GOT MONEY TO BUY MORE.: NEVER TRUE

## 2023-08-22 SDOH — ECONOMIC STABILITY: HOUSING INSECURITY
IN THE LAST 12 MONTHS, WAS THERE A TIME WHEN YOU DID NOT HAVE A STEADY PLACE TO SLEEP OR SLEPT IN A SHELTER (INCLUDING NOW)?: NO

## 2023-08-22 ASSESSMENT — PATIENT HEALTH QUESTIONNAIRE - PHQ9
1. LITTLE INTEREST OR PLEASURE IN DOING THINGS: 0
SUM OF ALL RESPONSES TO PHQ QUESTIONS 1-9: 0
3. TROUBLE FALLING OR STAYING ASLEEP: 0
2. FEELING DOWN, DEPRESSED OR HOPELESS: 0
SUM OF ALL RESPONSES TO PHQ QUESTIONS 1-9: 0
4. FEELING TIRED OR HAVING LITTLE ENERGY: 0
8. MOVING OR SPEAKING SO SLOWLY THAT OTHER PEOPLE COULD HAVE NOTICED. OR THE OPPOSITE, BEING SO FIGETY OR RESTLESS THAT YOU HAVE BEEN MOVING AROUND A LOT MORE THAN USUAL: 0
7. TROUBLE CONCENTRATING ON THINGS, SUCH AS READING THE NEWSPAPER OR WATCHING TELEVISION: 0
5. POOR APPETITE OR OVEREATING: 0
10. IF YOU CHECKED OFF ANY PROBLEMS, HOW DIFFICULT HAVE THESE PROBLEMS MADE IT FOR YOU TO DO YOUR WORK, TAKE CARE OF THINGS AT HOME, OR GET ALONG WITH OTHER PEOPLE: 0
SUM OF ALL RESPONSES TO PHQ9 QUESTIONS 1 & 2: 0
6. FEELING BAD ABOUT YOURSELF - OR THAT YOU ARE A FAILURE OR HAVE LET YOURSELF OR YOUR FAMILY DOWN: 0
9. THOUGHTS THAT YOU WOULD BE BETTER OFF DEAD, OR OF HURTING YOURSELF: 0

## 2023-08-22 NOTE — PROGRESS NOTES
Progress Note    she is a 43y.o. year old female who presents for evaluation. Subjective:     Mom has colon cancer she was dx at 61. Pt has colo scheduled for 9/28. States in July had appendectomy. States she is having the same problem she had before appy. States she is very gassy and burning sensation as well in upper abd. She has been taking nexium for this along with Pepcid and protonix. Feels like she is constipated. No f/c. Taking miralax without much relief. She is very anxious to get her colonoscopy done very anxious about colon cancer. Discussed that she had a recent CT scan and did not show anything concerning for colon cancer and she also had intra-abdominal surgery and I did not see anything that this should be some reassurance that there is nothing crazy going on. We also discussed that her mom was diagnosed at 61 she is currently 43 so she is 25 years younger than her mother at diagnosis we normally start screening 10 years prior. Reviewed PmHx, RxHx, FmHx, SocHx, AllgHx and updated and dated in the chart. Review of Systems - negative except as listed above in the HPI    Objective: There were no vitals filed for this visit. Current Outpatient Medications   Medication Sig    polyethylene glycol (GLYCOLAX) 17 g packet Take 1 packet by mouth daily as needed for Constipation    Plecanatide (TRULANCE) 3 MG TABS Take 3 mg by mouth daily    famotidine (PEPCID) 40 MG tablet TAKE 1 TABLET BY MOUTH BEFORE BREAKFAST AND DINNER    pantoprazole (PROTONIX) 40 MG tablet Take by mouth daily     No current facility-administered medications for this visit. Physical Examination: General appearance - alert, well appearing, and in no distress  Mental status - alert, oriented to person, place, and time      Assessment/ Plan:   Sonia José was seen today for abdominal pain.     Diagnoses and all orders for this visit:    Chronic idiopathic constipation  -     Plecanatide (TRULANCE) 3 MG TABS;

## 2023-10-03 ENCOUNTER — TELEPHONE (OUTPATIENT)
Age: 43
End: 2023-10-03

## 2023-10-03 NOTE — TELEPHONE ENCOUNTER
----- Message from Natalio Diaz sent at 10/3/2023  2:09 PM EDT -----  Subject: Appointment Request    Reason for Call: Established Patient Appointment needed: Routine Existing   Condition Follow Up    QUESTIONS    Reason for appointment request? No appointments available during search     Additional Information for Provider?  Patient would like to schedule an   appointment for a follow up visit with Keshawn Adam, please call and let her   know if she has any availability or not.  ---------------------------------------------------------------------------  --------------  Elton Xiong  [phone]; OK to leave message on voicemail  ---------------------------------------------------------------------------  --------------  SCRIPT ANSWERS

## 2023-10-03 NOTE — TELEPHONE ENCOUNTER
----- Message from Tom Holguin sent at 10/3/2023  4:50 PM EDT -----  Subject: Message to Provider    QUESTIONS  Information for Provider? Pt returning Candance's call. Could not reach   office.    ---------------------------------------------------------------------------  --------------  Emmie Stroud [facility]  [phone]; OK to leave message on voicemail  ---------------------------------------------------------------------------  --------------  SCRIPT ANSWERS  undefined

## 2023-10-30 ENCOUNTER — OFFICE VISIT (OUTPATIENT)
Age: 43
End: 2023-10-30
Payer: MEDICAID

## 2023-10-30 VITALS
HEART RATE: 82 BPM | HEIGHT: 63 IN | BODY MASS INDEX: 22.68 KG/M2 | RESPIRATION RATE: 15 BRPM | OXYGEN SATURATION: 95 % | SYSTOLIC BLOOD PRESSURE: 100 MMHG | WEIGHT: 128 LBS | TEMPERATURE: 98 F | DIASTOLIC BLOOD PRESSURE: 68 MMHG

## 2023-10-30 DIAGNOSIS — K21.9 GASTRO-ESOPHAGEAL REFLUX DISEASE WITHOUT ESOPHAGITIS: Primary | ICD-10-CM

## 2023-10-30 PROCEDURE — 99213 OFFICE O/P EST LOW 20 MIN: CPT | Performed by: NURSE PRACTITIONER

## 2023-10-30 RX ORDER — FAMOTIDINE 40 MG/1
TABLET, FILM COATED ORAL
Qty: 60 TABLET | Refills: 5 | Status: SHIPPED | OUTPATIENT
Start: 2023-10-30

## 2023-10-30 RX ORDER — PANTOPRAZOLE SODIUM 40 MG/1
40 TABLET, DELAYED RELEASE ORAL DAILY
Qty: 30 TABLET | Refills: 5 | Status: SHIPPED | OUTPATIENT
Start: 2023-10-30

## 2023-10-30 NOTE — PROGRESS NOTES
Chief Complaint   Patient presents with    Hiatal Hernia    Gastroesophageal Reflux     Gastrointestinal Specialist: Dr Nick Jones   [phone]. Endoscopy and Colonoscopy done     1. Have you been to the ER, urgent care clinic since your last visit? Hospitalized since your last visit? Yes Where: [facility]    2. Have you seen or consulted any other health care providers outside of the [address] [place] [address] since your last visit? Include any pap smears or colon screening. Gastrointestinal Specialist: Dr Nick Jones   [phone].  Endoscopy and Colonoscopy done

## 2023-11-09 ENCOUNTER — OFFICE VISIT (OUTPATIENT)
Age: 43
End: 2023-11-09
Payer: MEDICAID

## 2023-11-09 VITALS
OXYGEN SATURATION: 98 % | TEMPERATURE: 98 F | WEIGHT: 123 LBS | HEIGHT: 63 IN | DIASTOLIC BLOOD PRESSURE: 69 MMHG | HEART RATE: 84 BPM | RESPIRATION RATE: 14 BRPM | SYSTOLIC BLOOD PRESSURE: 102 MMHG | BODY MASS INDEX: 21.79 KG/M2

## 2023-11-09 DIAGNOSIS — K21.9 GASTRO-ESOPHAGEAL REFLUX DISEASE WITHOUT ESOPHAGITIS: ICD-10-CM

## 2023-11-09 DIAGNOSIS — Z00.00 WELL EXAM WITHOUT ABNORMAL FINDINGS OF PATIENT 18 YEARS OF AGE OR OLDER: Primary | ICD-10-CM

## 2023-11-09 PROCEDURE — 99396 PREV VISIT EST AGE 40-64: CPT | Performed by: NURSE PRACTITIONER

## 2023-11-09 NOTE — PROGRESS NOTES
Chief Complaint   Patient presents with    Lab Collection     Fasting today    Annual Exam     Endoscopy done in Sept.    Back Pain     1. Have you been to the ER, urgent care clinic since your last visit? Hospitalized since your last visit? No    2. Have you seen or consulted any other health care providers outside of the [address] [place] [address] Avenue since your last visit? Include any pap smears or colon screening.  No
Status: She is alert and oriented to person, place, and time.    Psychiatric:         Mood and Affect: Mood normal.             Latest Ref Rng & Units 1/4/2023     8:35 PM 5/18/2022     7:44 PM 10/12/2021    11:11 AM   LAB PRIMARY CARE   A1C 4.8 - 5.6 %   6.0    A1C POC 4.8 - 5.6 %   6.0    GLU random 65 - 100 mg/dL  175  98    CHOL 100 - 199 mg/dL   209    TRIG 0 - 149 mg/dL   75    HDL >39 mg/dL   57    LDL CALC 0 - 99 mg/dL   139     - 145 mmol/L  141  139    K 3.5 - 5.1 mmol/L  3.7  4.8    BUN 6 - 20 MG/DL  17  17    CR 0.55 - 1.02 MG/DL  0.74  0.60    CA 8.5 - 10.1 MG/DL  9.7  9.6    ALT 12 - 78 U/L  19  9    AST 15 - 37 U/L  12  13    TSH 0.450 - 4.500 uIU/mL   1.170    HGB 11.5 - 16.0 g/dL 12.1  13.4  14.4        Lab Results   Component Value Date/Time    CHOL 209 10/12/2021 11:11 AM    CHOL 166 02/05/2020 10:22 AM    TRIG 75 10/12/2021 11:11 AM    TRIG 54 02/05/2020 10:22 AM    HDL 57 10/12/2021 11:11 AM    HDL 62 02/05/2020 10:22 AM    LDLCALC 139 10/12/2021 11:11 AM    LDLCALC 93 02/05/2020 10:22 AM    GLUCOSE 175 05/18/2022 07:44 PM    LABA1C 6.0 10/12/2021 11:11 AM       The 10-year ASCVD risk score (Carly MIRAMONTES, et al., 2019) is: 0.4%    Values used to calculate the score:      Age: 43 years      Sex: Female      Is Non- : No      Diabetic: No      Tobacco smoker: No      Systolic Blood Pressure: 129 mmHg      Is BP treated: No      HDL Cholesterol: 57 mg/dL      Total Cholesterol: 209 mg/dL    Immunization History   Administered Date(s) Administered    COVID-19, MODERNA BLUE border, Primary or Immunocompromised, (age 12y+), IM, 100 mcg/0.5mL 03/20/2021    TDaP, ADACEL (age 6y-58y), BOOSTRIX (age 10y+), IM, 0.5mL 03/01/2011       Health Maintenance   Topic Date Due    Hepatitis B vaccine (1 of 3 - 3-dose series) Never done    Varicella vaccine (1 of 2 - 2-dose childhood series) Never done    Hepatitis C screen  Never done    Cervical cancer screen  Never done    DTaP/Tdap/Td

## 2023-11-10 LAB
ALBUMIN SERPL-MCNC: 4.7 G/DL (ref 3.9–4.9)
ALBUMIN/GLOB SERPL: 2 {RATIO} (ref 1.2–2.2)
ALP SERPL-CCNC: 70 IU/L (ref 44–121)
ALT SERPL-CCNC: 10 IU/L (ref 0–32)
AST SERPL-CCNC: 14 IU/L (ref 0–40)
BASOPHILS # BLD AUTO: 0 X10E3/UL (ref 0–0.2)
BASOPHILS NFR BLD AUTO: 1 %
BILIRUB SERPL-MCNC: 0.6 MG/DL (ref 0–1.2)
BUN SERPL-MCNC: 13 MG/DL (ref 6–24)
BUN/CREAT SERPL: 18 (ref 9–23)
CALCIUM SERPL-MCNC: 9.3 MG/DL (ref 8.7–10.2)
CHLORIDE SERPL-SCNC: 105 MMOL/L (ref 96–106)
CHOLEST SERPL-MCNC: 178 MG/DL (ref 100–199)
CO2 SERPL-SCNC: 19 MMOL/L (ref 20–29)
CREAT SERPL-MCNC: 0.73 MG/DL (ref 0.57–1)
EGFRCR SERPLBLD CKD-EPI 2021: 105 ML/MIN/1.73
EOSINOPHIL # BLD AUTO: 0.1 X10E3/UL (ref 0–0.4)
EOSINOPHIL NFR BLD AUTO: 2 %
ERYTHROCYTE [DISTWIDTH] IN BLOOD BY AUTOMATED COUNT: 12.7 % (ref 11.7–15.4)
GLOBULIN SER CALC-MCNC: 2.3 G/DL (ref 1.5–4.5)
GLUCOSE SERPL-MCNC: 103 MG/DL (ref 70–99)
HCT VFR BLD AUTO: 41.7 % (ref 34–46.6)
HDLC SERPL-MCNC: 57 MG/DL
HGB BLD-MCNC: 13.5 G/DL (ref 11.1–15.9)
IMM GRANULOCYTES # BLD AUTO: 0 X10E3/UL (ref 0–0.1)
IMM GRANULOCYTES NFR BLD AUTO: 0 %
IMP & REVIEW OF LAB RESULTS: NORMAL
LDLC SERPL CALC-MCNC: 110 MG/DL (ref 0–99)
LYMPHOCYTES # BLD AUTO: 1.7 X10E3/UL (ref 0.7–3.1)
LYMPHOCYTES NFR BLD AUTO: 29 %
MCH RBC QN AUTO: 29 PG (ref 26.6–33)
MCHC RBC AUTO-ENTMCNC: 32.4 G/DL (ref 31.5–35.7)
MCV RBC AUTO: 90 FL (ref 79–97)
MONOCYTES # BLD AUTO: 0.3 X10E3/UL (ref 0.1–0.9)
MONOCYTES NFR BLD AUTO: 6 %
NEUTROPHILS # BLD AUTO: 3.7 X10E3/UL (ref 1.4–7)
NEUTROPHILS NFR BLD AUTO: 62 %
PLATELET # BLD AUTO: 182 X10E3/UL (ref 150–450)
POTASSIUM SERPL-SCNC: 3.9 MMOL/L (ref 3.5–5.2)
PROT SERPL-MCNC: 7 G/DL (ref 6–8.5)
RBC # BLD AUTO: 4.65 X10E6/UL (ref 3.77–5.28)
SODIUM SERPL-SCNC: 142 MMOL/L (ref 134–144)
TRIGL SERPL-MCNC: 56 MG/DL (ref 0–149)
TSH SERPL DL<=0.005 MIU/L-ACNC: 1.03 UIU/ML (ref 0.45–4.5)
VLDLC SERPL CALC-MCNC: 11 MG/DL (ref 5–40)
WBC # BLD AUTO: 5.9 X10E3/UL (ref 3.4–10.8)

## 2023-11-16 ENCOUNTER — TELEPHONE (OUTPATIENT)
Age: 43
End: 2023-11-16

## 2024-03-18 LAB — SARS-COV-2: NEGATIVE

## 2024-04-03 ENCOUNTER — TELEMEDICINE (OUTPATIENT)
Age: 44
End: 2024-04-03
Payer: MEDICAID

## 2024-04-03 DIAGNOSIS — E87.6 HYPOKALEMIA: ICD-10-CM

## 2024-04-03 DIAGNOSIS — R42 VERTIGO: Primary | ICD-10-CM

## 2024-04-03 DIAGNOSIS — I95.9 HYPOTENSION, UNSPECIFIED HYPOTENSION TYPE: ICD-10-CM

## 2024-04-03 PROCEDURE — 99214 OFFICE O/P EST MOD 30 MIN: CPT | Performed by: FAMILY MEDICINE

## 2024-04-03 RX ORDER — POTASSIUM CHLORIDE 20 MEQ/1
20 TABLET, EXTENDED RELEASE ORAL DAILY
Qty: 30 TABLET | Refills: 5 | Status: SHIPPED | OUTPATIENT
Start: 2024-04-03

## 2024-04-03 ASSESSMENT — PATIENT HEALTH QUESTIONNAIRE - PHQ9
1. LITTLE INTEREST OR PLEASURE IN DOING THINGS: NOT AT ALL
SUM OF ALL RESPONSES TO PHQ9 QUESTIONS 1 & 2: 0
SUM OF ALL RESPONSES TO PHQ QUESTIONS 1-9: 0
2. FEELING DOWN, DEPRESSED OR HOPELESS: NOT AT ALL
SUM OF ALL RESPONSES TO PHQ QUESTIONS 1-9: 0

## 2024-04-03 NOTE — PROGRESS NOTES
Consent: Bárbara Barrera, who was seen by synchronous (real-time) audio-video technology, and/or her healthcare decision maker, is aware that this patient-initiated, Telehealth encounter on 4/3/2024 is a billable service, with coverage as determined by her insurance carrier. She is aware that she may receive a bill and has provided verbal consent to proceed: YES-Consent obtained within past 12 months  712    Prior to Admission medications    Medication Sig Start Date End Date Taking? Authorizing Provider   potassium chloride (KLOR-CON M) 20 MEQ extended release tablet Take 1 tablet by mouth daily 4/3/24  Yes Vern Jauregui MD   famotidine (PEPCID) 40 MG tablet TAKE 1 TABLET BY MOUTH BEFORE BREAKFAST AND DINNER 10/30/23   Maritza Cr APRN - NP   pantoprazole (PROTONIX) 40 MG tablet Take 1 tablet by mouth daily 10/30/23   Maritza Cr APRN - NP     No Known Allergies        Assessment & Plan:       ICD-10-CM    1. Vertigo  R42 Kuldip Slater MD, Cardiology, [place]   Patient is having vertigo with position changes and going from sitting to standing or lying to standing.  Symptoms are consistent with orthostatic hypotension.  Patient not on blood pressure medications.  Past blood pressures are been low in the office.  Refer to cardiology for workup.  Recommend patient increase hydration with Gatorade.   2. Hypotension, unspecified hypotension type  I95.9 Kuldip Slater MD, Cardiology, [place]      3. Hypokalemia  E87.6 potassium chloride (KLOR-CON M) 20 MEQ extended release tablet   Did go to ED was found to have low potassium therefore add medication           Medication Side Effects and Warnings were discussed with patient  Patient Labs were reviewed and or requested:  Patient Past Records were reviewed and or requested              We discussed the expected course, resolution and complications of the diagnosis(es) in detail.  Medication risks, benefits, costs, interactions, and

## 2024-05-06 ENCOUNTER — OFFICE VISIT (OUTPATIENT)
Age: 44
End: 2024-05-06
Payer: MEDICAID

## 2024-05-06 VITALS
SYSTOLIC BLOOD PRESSURE: 88 MMHG | WEIGHT: 126.2 LBS | OXYGEN SATURATION: 98 % | DIASTOLIC BLOOD PRESSURE: 60 MMHG | BODY MASS INDEX: 22.36 KG/M2 | HEART RATE: 85 BPM | HEIGHT: 63 IN

## 2024-05-06 DIAGNOSIS — R42 VERTIGO: ICD-10-CM

## 2024-05-06 DIAGNOSIS — R07.9 CHEST PAIN, UNSPECIFIED TYPE: Primary | ICD-10-CM

## 2024-05-06 PROCEDURE — 99204 OFFICE O/P NEW MOD 45 MIN: CPT | Performed by: SPECIALIST

## 2024-05-06 PROCEDURE — 93005 ELECTROCARDIOGRAM TRACING: CPT | Performed by: SPECIALIST

## 2024-05-06 PROCEDURE — 93010 ELECTROCARDIOGRAM REPORT: CPT | Performed by: SPECIALIST

## 2024-05-06 NOTE — PROGRESS NOTES
Chief Complaint   Patient presents with    New Patient     referred by PCP Todd Cole-Hypotention, Vertigo    Palpitations    Dizziness     Vitals:    05/06/24 1549   Weight: 57.2 kg (126 lb 3.2 oz)   Height: 1.6 m (5' 3\")       Chest pain NO Reports SOB at times and Dizzy at times. Reports palpatations at times    ER, urgent care, or hospitalized outside of [facility] since your last visit?  NO     Refills NO   Has had a baby later in life less than a year ago, currently has 2 teenage children.  Father had a stroke  
Negative for abdominal pain, diarrhea, melena.   Genitourinary: Negative for dysuria  Musculoskeletal: Negative for myalgias.   Skin: Negative for rash  Heme: No problems bleeding.  Neurological: Negative for speech change and focal weakness.       PHYSICAL EXAM:     Physical Exam:  BP (!) 88/60 (Site: Left Upper Arm, Position: Standing, Cuff Size: Medium Adult)   Pulse 85   Ht 1.6 m (5' 3\")   Wt 57.2 kg (126 lb 3.2 oz)   SpO2 98%   BMI 22.36 kg/m²     Patient appears generally well, mood and affect are appropriate and pleasant.  HEENT:  Hearing intact, non-icteric, normocephalic, atraumatic.   Neck Exam: Supple, No JVD   Lung Exam: Clear to auscultation, even breath sounds.   Cardiac Exam: Regular rate and rhythm with no murmur or rub  Abdomen: Soft, non-tender  Extremities: Moves all ext well. No lower extremity edema.  MSKTL: Overall good ROM ext  Skin: No significant rashes  Psych: Appropriate affect  Neuro - Grossly intact    Vitals:    05/06/24 1549 05/06/24 1607 05/06/24 1609   BP: 110/60 98/60 (!) 88/60   Site: Left Upper Arm Left Upper Arm Left Upper Arm   Position: Supine Standing Standing   Cuff Size: Medium Adult Medium Adult Medium Adult   Pulse: 77 85 85   SpO2: 98%     Weight: 57.2 kg (126 lb 3.2 oz)     Height: 1.6 m (5' 3\")             LABS / OTHER STUDIES:     Lab Results   Component Value Date     11/09/2023    K 3.9 11/09/2023     11/09/2023    CO2 19 (L) 11/09/2023    BUN 13 11/09/2023    CREATININE 0.73 11/09/2023    GLUCOSE 103 (H) 11/09/2023    CALCIUM 9.3 11/09/2023    BILITOT 0.6 11/09/2023    ALKPHOS 70 11/09/2023    AST 14 11/09/2023    ALT 10 11/09/2023    LABGLOM 105 11/09/2023    GFRAA >60 05/18/2022    AGRATIO 2.0 11/09/2023    GLOB 3.7 05/18/2022       Lab Results   Component Value Date    WBC 5.9 11/09/2023    HGB 13.5 11/09/2023    HCT 41.7 11/09/2023    MCV 90 11/09/2023     11/09/2023     Lab Results   Component Value Date    CHOL 178 11/09/2023    TRIG 56

## 2024-07-24 ENCOUNTER — ANCILLARY PROCEDURE (OUTPATIENT)
Age: 44
End: 2024-07-24
Payer: MEDICAID

## 2024-07-24 VITALS
SYSTOLIC BLOOD PRESSURE: 98 MMHG | BODY MASS INDEX: 22.32 KG/M2 | HEART RATE: 81 BPM | HEIGHT: 63 IN | WEIGHT: 126 LBS | DIASTOLIC BLOOD PRESSURE: 62 MMHG

## 2024-07-24 DIAGNOSIS — R42 VERTIGO: ICD-10-CM

## 2024-07-24 DIAGNOSIS — R07.9 CHEST PAIN, UNSPECIFIED TYPE: ICD-10-CM

## 2024-07-24 PROCEDURE — 93306 TTE W/DOPPLER COMPLETE: CPT | Performed by: SPECIALIST

## 2024-07-25 LAB
ECHO AO ASC DIAM: 2.8 CM
ECHO AO ASCENDING AORTA INDEX: 1.76 CM/M2
ECHO AO ROOT DIAM: 2.8 CM
ECHO AO ROOT INDEX: 1.76 CM/M2
ECHO AV AREA PEAK VELOCITY: 1.7 CM2
ECHO AV AREA VTI: 1.9 CM2
ECHO AV AREA/BSA PEAK VELOCITY: 1.1 CM2/M2
ECHO AV AREA/BSA VTI: 1.2 CM2/M2
ECHO AV MEAN GRADIENT: 3 MMHG
ECHO AV MEAN VELOCITY: 0.9 M/S
ECHO AV PEAK GRADIENT: 7 MMHG
ECHO AV PEAK VELOCITY: 1.3 M/S
ECHO AV VELOCITY RATIO: 0.62
ECHO AV VTI: 23.7 CM
ECHO BSA: 1.59 M2
ECHO EST RA PRESSURE: 3 MMHG
ECHO LA DIAMETER INDEX: 1.89 CM/M2
ECHO LA DIAMETER: 3 CM
ECHO LA TO AORTIC ROOT RATIO: 1.07
ECHO LA VOL A-L A2C: 24 ML (ref 22–52)
ECHO LA VOL A-L A4C: 32 ML (ref 22–52)
ECHO LA VOL BP: 27 ML (ref 22–52)
ECHO LA VOL MOD A2C: 22 ML (ref 22–52)
ECHO LA VOL MOD A4C: 28 ML (ref 22–52)
ECHO LA VOL/BSA BIPLANE: 17 ML/M2 (ref 16–34)
ECHO LA VOLUME AREA LENGTH: 30 ML
ECHO LA VOLUME INDEX A-L A2C: 15 ML/M2 (ref 16–34)
ECHO LA VOLUME INDEX A-L A4C: 20 ML/M2 (ref 16–34)
ECHO LA VOLUME INDEX AREA LENGTH: 19 ML/M2 (ref 16–34)
ECHO LA VOLUME INDEX MOD A2C: 14 ML/M2 (ref 16–34)
ECHO LA VOLUME INDEX MOD A4C: 18 ML/M2 (ref 16–34)
ECHO LV E' LATERAL VELOCITY: 15 CM/S
ECHO LV E' SEPTAL VELOCITY: 13 CM/S
ECHO LV EDV A2C: 69 ML
ECHO LV EDV A4C: 60 ML
ECHO LV EDV BP: 65 ML (ref 56–104)
ECHO LV EDV INDEX A4C: 38 ML/M2
ECHO LV EDV INDEX BP: 41 ML/M2
ECHO LV EDV NDEX A2C: 43 ML/M2
ECHO LV EJECTION FRACTION A2C: 65 %
ECHO LV EJECTION FRACTION A4C: 58 %
ECHO LV EJECTION FRACTION BIPLANE: 61 % (ref 55–100)
ECHO LV ESV A2C: 24 ML
ECHO LV ESV A4C: 25 ML
ECHO LV ESV BP: 25 ML (ref 19–49)
ECHO LV ESV INDEX A2C: 15 ML/M2
ECHO LV ESV INDEX A4C: 16 ML/M2
ECHO LV ESV INDEX BP: 16 ML/M2
ECHO LV FRACTIONAL SHORTENING: 33 % (ref 28–44)
ECHO LV INTERNAL DIMENSION DIASTOLE INDEX: 2.64 CM/M2
ECHO LV INTERNAL DIMENSION DIASTOLIC: 4.2 CM (ref 3.9–5.3)
ECHO LV INTERNAL DIMENSION SYSTOLIC INDEX: 1.76 CM/M2
ECHO LV INTERNAL DIMENSION SYSTOLIC: 2.8 CM
ECHO LV IVSD: 0.8 CM (ref 0.6–0.9)
ECHO LV MASS 2D: 85.1 G (ref 67–162)
ECHO LV MASS INDEX 2D: 53.5 G/M2 (ref 43–95)
ECHO LV POSTERIOR WALL DIASTOLIC: 0.6 CM (ref 0.6–0.9)
ECHO LV RELATIVE WALL THICKNESS RATIO: 0.29
ECHO LVOT AREA: 2.5 CM2
ECHO LVOT AV VTI INDEX: 0.71
ECHO LVOT DIAM: 1.8 CM
ECHO LVOT MEAN GRADIENT: 2 MMHG
ECHO LVOT PEAK GRADIENT: 3 MMHG
ECHO LVOT PEAK VELOCITY: 0.8 M/S
ECHO LVOT STROKE VOLUME INDEX: 26.9 ML/M2
ECHO LVOT SV: 42.7 ML
ECHO LVOT VTI: 16.8 CM
ECHO MV A VELOCITY: 0.73 M/S
ECHO MV AREA PHT: 3.6 CM2
ECHO MV E DECELERATION TIME (DT): 211.1 MS
ECHO MV E VELOCITY: 0.83 M/S
ECHO MV E/A RATIO: 1.14
ECHO MV E/E' LATERAL: 5.53
ECHO MV E/E' RATIO (AVERAGED): 5.96
ECHO MV E/E' SEPTAL: 6.38
ECHO MV PRESSURE HALF TIME (PHT): 61.2 MS
ECHO RIGHT VENTRICULAR SYSTOLIC PRESSURE (RVSP): 24 MMHG
ECHO RV FREE WALL PEAK S': 14 CM/S
ECHO RV INTERNAL DIMENSION: 3 CM
ECHO RV TAPSE: 2.5 CM (ref 1.7–?)
ECHO TV REGURGITANT MAX VELOCITY: 2.27 M/S
ECHO TV REGURGITANT PEAK GRADIENT: 21 MMHG

## 2024-09-25 ENCOUNTER — OFFICE VISIT (OUTPATIENT)
Age: 44
End: 2024-09-25
Payer: MEDICAID

## 2024-09-25 VITALS
SYSTOLIC BLOOD PRESSURE: 102 MMHG | TEMPERATURE: 97.9 F | OXYGEN SATURATION: 100 % | BODY MASS INDEX: 22.91 KG/M2 | DIASTOLIC BLOOD PRESSURE: 72 MMHG | HEIGHT: 63 IN | WEIGHT: 129.3 LBS | HEART RATE: 66 BPM

## 2024-09-25 DIAGNOSIS — Z00.00 ENCOUNTER FOR WELL ADULT EXAM WITHOUT ABNORMAL FINDINGS: Primary | ICD-10-CM

## 2024-09-25 DIAGNOSIS — G89.29 CHRONIC PAIN OF LEFT KNEE: ICD-10-CM

## 2024-09-25 DIAGNOSIS — M25.562 CHRONIC PAIN OF LEFT KNEE: ICD-10-CM

## 2024-09-25 PROCEDURE — 99396 PREV VISIT EST AGE 40-64: CPT | Performed by: NURSE PRACTITIONER

## 2024-09-26 LAB
ALBUMIN SERPL-MCNC: 4.6 G/DL (ref 3.9–4.9)
ALP SERPL-CCNC: 82 IU/L (ref 44–121)
ALT SERPL-CCNC: 11 IU/L (ref 0–32)
AST SERPL-CCNC: 17 IU/L (ref 0–40)
BASOPHILS # BLD AUTO: 0.1 X10E3/UL (ref 0–0.2)
BASOPHILS NFR BLD AUTO: 1 %
BILIRUB SERPL-MCNC: 0.4 MG/DL (ref 0–1.2)
BUN SERPL-MCNC: 20 MG/DL (ref 6–24)
BUN/CREAT SERPL: 32 (ref 9–23)
CALCIUM SERPL-MCNC: 9.6 MG/DL (ref 8.7–10.2)
CHLORIDE SERPL-SCNC: 104 MMOL/L (ref 96–106)
CHOLEST SERPL-MCNC: 200 MG/DL (ref 100–199)
CO2 SERPL-SCNC: 22 MMOL/L (ref 20–29)
CREAT SERPL-MCNC: 0.62 MG/DL (ref 0.57–1)
EGFRCR SERPLBLD CKD-EPI 2021: 113 ML/MIN/1.73
EOSINOPHIL # BLD AUTO: 0.1 X10E3/UL (ref 0–0.4)
EOSINOPHIL NFR BLD AUTO: 2 %
ERYTHROCYTE [DISTWIDTH] IN BLOOD BY AUTOMATED COUNT: 12.7 % (ref 11.7–15.4)
GLOBULIN SER CALC-MCNC: 2.5 G/DL (ref 1.5–4.5)
GLUCOSE SERPL-MCNC: 103 MG/DL (ref 70–99)
HBA1C MFR BLD: 6 % (ref 4.8–5.6)
HCT VFR BLD AUTO: 42.9 % (ref 34–46.6)
HDLC SERPL-MCNC: 52 MG/DL
HGB BLD-MCNC: 13.8 G/DL (ref 11.1–15.9)
IMM GRANULOCYTES # BLD AUTO: 0 X10E3/UL (ref 0–0.1)
IMM GRANULOCYTES NFR BLD AUTO: 0 %
IMP & REVIEW OF LAB RESULTS: NORMAL
LDLC SERPL CALC-MCNC: 132 MG/DL (ref 0–99)
LYMPHOCYTES # BLD AUTO: 2.2 X10E3/UL (ref 0.7–3.1)
LYMPHOCYTES NFR BLD AUTO: 43 %
MCH RBC QN AUTO: 29.8 PG (ref 26.6–33)
MCHC RBC AUTO-ENTMCNC: 32.2 G/DL (ref 31.5–35.7)
MCV RBC AUTO: 93 FL (ref 79–97)
MONOCYTES # BLD AUTO: 0.3 X10E3/UL (ref 0.1–0.9)
MONOCYTES NFR BLD AUTO: 6 %
NEUTROPHILS # BLD AUTO: 2.4 X10E3/UL (ref 1.4–7)
NEUTROPHILS NFR BLD AUTO: 48 %
PLATELET # BLD AUTO: 196 X10E3/UL (ref 150–450)
POTASSIUM SERPL-SCNC: 4.4 MMOL/L (ref 3.5–5.2)
PROT SERPL-MCNC: 7.1 G/DL (ref 6–8.5)
RBC # BLD AUTO: 4.63 X10E6/UL (ref 3.77–5.28)
SODIUM SERPL-SCNC: 139 MMOL/L (ref 134–144)
T4 FREE SERPL-MCNC: 1.14 NG/DL (ref 0.82–1.77)
TRIGL SERPL-MCNC: 89 MG/DL (ref 0–149)
TSH SERPL DL<=0.005 MIU/L-ACNC: 1.7 UIU/ML (ref 0.45–4.5)
VLDLC SERPL CALC-MCNC: 16 MG/DL (ref 5–40)
WBC # BLD AUTO: 5.1 X10E3/UL (ref 3.4–10.8)

## 2024-10-23 ENCOUNTER — TELEPHONE (OUTPATIENT)
Age: 44
End: 2024-10-23

## 2024-10-23 NOTE — TELEPHONE ENCOUNTER
----- Message from Samantha TOWNSEND sent at 10/23/2024  4:38 PM EDT -----  Regarding: ECC Escalation To Practice  ECC Escalation To Practice      Type of Escalation: Acute Care Symptom  --------------------------------------------------------------------------------------------------------------------------    Information for Provider:  Patient is looking for appointment for: Stomach pain and diarrhea  Reasons for Message: Unable to reach practice     Additional Information: patient refused the Monday availability and wants to have an appointment for tomorrow 10/24/2024   --------------------------------------------------------------------------------------------------------------------------    Relationship to Patient: Self     Call Back Info: OK to leave message on voicemail  Preferred Call Back Number: [phone] (home)

## 2024-10-23 NOTE — TELEPHONE ENCOUNTER
Returned call to pt and ID x 3.  Advised that we are not in office tomorrow. Scheduled pt appt with Jazzy.

## 2024-10-24 ENCOUNTER — OFFICE VISIT (OUTPATIENT)
Age: 44
End: 2024-10-24
Payer: MEDICAID

## 2024-10-24 VITALS
TEMPERATURE: 98.8 F | SYSTOLIC BLOOD PRESSURE: 110 MMHG | OXYGEN SATURATION: 97 % | BODY MASS INDEX: 22.5 KG/M2 | RESPIRATION RATE: 20 BRPM | DIASTOLIC BLOOD PRESSURE: 74 MMHG | HEART RATE: 92 BPM | HEIGHT: 63 IN | WEIGHT: 127 LBS

## 2024-10-24 DIAGNOSIS — R10.12 LEFT UPPER QUADRANT PAIN: Primary | ICD-10-CM

## 2024-10-24 PROCEDURE — 99213 OFFICE O/P EST LOW 20 MIN: CPT | Performed by: PHYSICIAN ASSISTANT

## 2024-10-24 SDOH — ECONOMIC STABILITY: FOOD INSECURITY: WITHIN THE PAST 12 MONTHS, THE FOOD YOU BOUGHT JUST DIDN'T LAST AND YOU DIDN'T HAVE MONEY TO GET MORE.: NEVER TRUE

## 2024-10-24 SDOH — ECONOMIC STABILITY: FOOD INSECURITY: WITHIN THE PAST 12 MONTHS, YOU WORRIED THAT YOUR FOOD WOULD RUN OUT BEFORE YOU GOT MONEY TO BUY MORE.: NEVER TRUE

## 2024-10-24 SDOH — ECONOMIC STABILITY: INCOME INSECURITY: HOW HARD IS IT FOR YOU TO PAY FOR THE VERY BASICS LIKE FOOD, HOUSING, MEDICAL CARE, AND HEATING?: NOT HARD AT ALL

## 2024-10-24 ASSESSMENT — PATIENT HEALTH QUESTIONNAIRE - PHQ9
2. FEELING DOWN, DEPRESSED OR HOPELESS: NOT AT ALL
10. IF YOU CHECKED OFF ANY PROBLEMS, HOW DIFFICULT HAVE THESE PROBLEMS MADE IT FOR YOU TO DO YOUR WORK, TAKE CARE OF THINGS AT HOME, OR GET ALONG WITH OTHER PEOPLE: NOT DIFFICULT AT ALL
SUM OF ALL RESPONSES TO PHQ QUESTIONS 1-9: 0
4. FEELING TIRED OR HAVING LITTLE ENERGY: NOT AT ALL
7. TROUBLE CONCENTRATING ON THINGS, SUCH AS READING THE NEWSPAPER OR WATCHING TELEVISION: NOT AT ALL
9. THOUGHTS THAT YOU WOULD BE BETTER OFF DEAD, OR OF HURTING YOURSELF: NOT AT ALL
5. POOR APPETITE OR OVEREATING: NOT AT ALL
SUM OF ALL RESPONSES TO PHQ QUESTIONS 1-9: 0
8. MOVING OR SPEAKING SO SLOWLY THAT OTHER PEOPLE COULD HAVE NOTICED. OR THE OPPOSITE, BEING SO FIGETY OR RESTLESS THAT YOU HAVE BEEN MOVING AROUND A LOT MORE THAN USUAL: NOT AT ALL
1. LITTLE INTEREST OR PLEASURE IN DOING THINGS: NOT AT ALL
SUM OF ALL RESPONSES TO PHQ9 QUESTIONS 1 & 2: 0
SUM OF ALL RESPONSES TO PHQ QUESTIONS 1-9: 0
6. FEELING BAD ABOUT YOURSELF - OR THAT YOU ARE A FAILURE OR HAVE LET YOURSELF OR YOUR FAMILY DOWN: NOT AT ALL
SUM OF ALL RESPONSES TO PHQ QUESTIONS 1-9: 0
3. TROUBLE FALLING OR STAYING ASLEEP: NOT AT ALL

## 2024-10-24 NOTE — PROGRESS NOTES
Bárbara Barrera is a 43 y.o. female , id x 2(name and ). Reviewed record, history, and  medications.      Chief Complaint   Patient presents with    Abdominal Pain     Patient c/o LUQ pain 2-3 weeks, diarrhea off and on for  2-3 weeks ago.          Vitals:    10/24/24 1012   Weight: 57.6 kg (127 lb)   Height: 1.6 m (5' 3\")             10/24/2024    10:14 AM   PHQ-9    Little interest or pleasure in doing things 0   Feeling down, depressed, or hopeless 0   Trouble falling or staying asleep, or sleeping too much 0   Feeling tired or having little energy 0   Poor appetite or overeating 0   Feeling bad about yourself - or that you are a failure or have let yourself or your family down 0   Trouble concentrating on things, such as reading the newspaper or watching television 0   Moving or speaking so slowly that other people could have noticed. Or the opposite - being so fidgety or restless that you have been moving around a lot more than usual 0   Thoughts that you would be better off dead, or of hurting yourself in some way 0   PHQ-2 Score 0   PHQ-9 Total Score 0   If you checked off any problems, how difficult have these problems made it for you to do your work, take care of things at home, or get along with other people? 0            No data to display                Social Determinants of Health     Tobacco Use: Low Risk  (2024)    Patient History     Smoking Tobacco Use: Never     Smokeless Tobacco Use: Never     Passive Exposure: Not on file   Alcohol Use: Not on file   Financial Resource Strain: Low Risk  (10/24/2024)    Overall Financial Resource Strain (CARDIA)     Difficulty of Paying Living Expenses: Not hard at all   Food Insecurity: No Food Insecurity (10/24/2024)    Hunger Vital Sign     Worried About Running Out of Food in the Last Year: Never true     Ran Out of Food in the Last Year: Never true   Transportation Needs: Unknown (10/24/2024)    PRAPARE - Transportation     Lack of Transportation

## 2024-10-24 NOTE — PROGRESS NOTES
Bárbara Barrera (:  1980) is a 43 y.o. female, here for evaluation of the following chief complaint(s):  Abdominal Pain (Patient c/o LUQ pain 2-3 weeks, diarrhea off and on for  2-3 weeks ago. )         Assessment & Plan  1. Abdominal pain.  Given her recent colonoscopy in , the likelihood of colon cancer is considered low. A CT scan of the abdomen will be ordered to evaluate her organs. Labs of  lipase and amylase will be conducted to check for any abnormalities. She is advised to monitor her diet and note any correlation between food intake and the onset of abdominal pain. If the CT scan and lab results return normal, she will follow up with her gastroenterologist. If the gastroenterologist's findings are also normal, a diagnosis of irritable bowel syndrome (IBS) may be considered.    2. Diarrhea.  She reports intermittent bouts of diarrhea over the past three weeks. This symptom will be evaluated in conjunction with the abdominal pain. The CT scan and labs will help rule out other potential causes. If no other cause is identified, IBS may be considered.    3. Constipation.  She also experiences intermittent bouts of constipation. This symptom will be evaluated alongside her abdominal pain and diarrhea. The CT scan and labs will help rule out other potential causes. If no other cause is identified, IBS may be considered.        Results    1. Left upper quadrant pain  -     Lipase; Future  -     Amylase; Future  The patient most recently had labs done.  I would like to get a lipase and amylase.  She recontact with these results once back.  Also I have asked the patient to start to make a log of when she is having symptoms.  We talked briefly about IBS.  This is something that should be considered if all other testing is negative.  I explained to her that IBS is usually a diagnosis of exclusion.  No follow-ups on file.       Subjective   History of Present Illness  The patient presents for evaluation

## 2024-10-25 LAB
AMYLASE SERPL-CCNC: 21 U/L (ref 31–110)
LIPASE SERPL-CCNC: 32 U/L (ref 14–72)

## 2025-02-05 ENCOUNTER — ANESTHESIA EVENT (OUTPATIENT)
Facility: HOSPITAL | Age: 45
End: 2025-02-05
Payer: MEDICAID

## 2025-02-05 ENCOUNTER — HOSPITAL ENCOUNTER (OUTPATIENT)
Facility: HOSPITAL | Age: 45
Setting detail: OUTPATIENT SURGERY
Discharge: HOME | End: 2025-02-05
Attending: INTERNAL MEDICINE | Admitting: INTERNAL MEDICINE
Payer: MEDICAID

## 2025-02-05 ENCOUNTER — ANESTHESIA (OUTPATIENT)
Facility: HOSPITAL | Age: 45
End: 2025-02-05
Payer: MEDICAID

## 2025-02-05 VITALS
SYSTOLIC BLOOD PRESSURE: 104 MMHG | TEMPERATURE: 97.6 F | HEART RATE: 64 BPM | RESPIRATION RATE: 16 BRPM | DIASTOLIC BLOOD PRESSURE: 66 MMHG | OXYGEN SATURATION: 100 %

## 2025-02-05 PROCEDURE — 3600007512: Performed by: INTERNAL MEDICINE

## 2025-02-05 PROCEDURE — 2709999900 HC NON-CHARGEABLE SUPPLY: Performed by: INTERNAL MEDICINE

## 2025-02-05 PROCEDURE — 2580000003 HC RX 258: Performed by: NURSE ANESTHETIST, CERTIFIED REGISTERED

## 2025-02-05 PROCEDURE — 7100000010 HC PHASE II RECOVERY - FIRST 15 MIN: Performed by: INTERNAL MEDICINE

## 2025-02-05 PROCEDURE — 88305 TISSUE EXAM BY PATHOLOGIST: CPT

## 2025-02-05 PROCEDURE — 2720000010 HC SURG SUPPLY STERILE: Performed by: INTERNAL MEDICINE

## 2025-02-05 PROCEDURE — 7100000011 HC PHASE II RECOVERY - ADDTL 15 MIN: Performed by: INTERNAL MEDICINE

## 2025-02-05 PROCEDURE — 3600007502: Performed by: INTERNAL MEDICINE

## 2025-02-05 PROCEDURE — 3700000001 HC ADD 15 MINUTES (ANESTHESIA): Performed by: INTERNAL MEDICINE

## 2025-02-05 PROCEDURE — 3700000000 HC ANESTHESIA ATTENDED CARE: Performed by: INTERNAL MEDICINE

## 2025-02-05 PROCEDURE — 6360000002 HC RX W HCPCS: Performed by: NURSE ANESTHETIST, CERTIFIED REGISTERED

## 2025-02-05 RX ORDER — SODIUM CHLORIDE 9 MG/ML
INJECTION, SOLUTION INTRAVENOUS
Status: DISCONTINUED | OUTPATIENT
Start: 2025-02-05 | End: 2025-02-05 | Stop reason: SDUPTHER

## 2025-02-05 RX ORDER — SODIUM CHLORIDE 9 MG/ML
INJECTION, SOLUTION INTRAVENOUS CONTINUOUS
Status: CANCELLED | OUTPATIENT
Start: 2025-02-05

## 2025-02-05 RX ORDER — LIDOCAINE HYDROCHLORIDE 20 MG/ML
INJECTION, SOLUTION EPIDURAL; INFILTRATION; INTRACAUDAL; PERINEURAL
Status: DISCONTINUED | OUTPATIENT
Start: 2025-02-05 | End: 2025-02-05 | Stop reason: SDUPTHER

## 2025-02-05 RX ADMIN — PROPOFOL 100 MG: 10 INJECTION, EMULSION INTRAVENOUS at 08:28

## 2025-02-05 RX ADMIN — PROPOFOL 50 MG: 10 INJECTION, EMULSION INTRAVENOUS at 08:31

## 2025-02-05 RX ADMIN — PROPOFOL 50 MG: 10 INJECTION, EMULSION INTRAVENOUS at 08:33

## 2025-02-05 RX ADMIN — LIDOCAINE HYDROCHLORIDE 80 MG: 20 INJECTION, SOLUTION EPIDURAL; INFILTRATION; INTRACAUDAL; PERINEURAL at 08:28

## 2025-02-05 RX ADMIN — SODIUM CHLORIDE: 9 INJECTION, SOLUTION INTRAVENOUS at 08:26

## 2025-02-05 RX ADMIN — PROPOFOL 50 MG: 10 INJECTION, EMULSION INTRAVENOUS at 08:42

## 2025-02-05 RX ADMIN — PROPOFOL 50 MG: 10 INJECTION, EMULSION INTRAVENOUS at 08:29

## 2025-02-05 RX ADMIN — PROPOFOL 50 MG: 10 INJECTION, EMULSION INTRAVENOUS at 08:39

## 2025-02-05 RX ADMIN — PROPOFOL 50 MG: 10 INJECTION, EMULSION INTRAVENOUS at 08:36

## 2025-02-05 RX ADMIN — PROPOFOL 25 MG: 10 INJECTION, EMULSION INTRAVENOUS at 08:46

## 2025-02-05 ASSESSMENT — PAIN - FUNCTIONAL ASSESSMENT
PAIN_FUNCTIONAL_ASSESSMENT: NONE - DENIES PAIN

## 2025-02-05 NOTE — H&P
[facility] [address] [place], [place] [address]                     History and Physical     NAME: Bárbara Barrera   :  1980   MRN:  [id number]     HPI:  Pt comes for a f/u after had her GB removed last . She continues to have mid-epigastric and stephania-umbilical pain. Associated alternating diarrhea and constipation. Denies GI bleeding.NO weight loss. Pt is very concerned about her family hx of mother currently with stage 4 colon ca. Pt also complains of bloating /gas sensation. Pt is taking pepto bismol helps heartburn and dyspepsia. Takes ibuprofen regularly for joint pains and/or AC. Pt denies moking or heavy ETOH use. Pt takes daily PPI for gerd. No dysphagia. Diet is unchanged.      Past Surgical History:   Procedure Laterality Date    APPENDECTOMY       SECTION      CHOLECYSTECTOMY  2024     Past Medical History:   Diagnosis Date    Psychiatric problem      Social History     Tobacco Use    Smoking status: Never    Smokeless tobacco: Never   Substance Use Topics    Alcohol use: No    Drug use: No     No Known Allergies  Family History   Problem Relation Age of Onset    Diabetes Mother     Stroke Father         x's 2    Hypertension Father     Cancer Mother 62        Colon     No current facility-administered medications for this encounter.         PHYSICAL EXAM:  General: WD, WN. Alert, cooperative, no acute distress    HEENT: NC, Atraumatic.  PERRLA, EOMI. Anicteric sclerae.  Lungs:  CTA Bilaterally. No Wheezing/Rhonchi/Rales.  Heart:  Regular  rhythm,  No murmur, No Rubs, No Gallops  Abdomen: Soft, Non distended, Non tender.  +Bowel sounds, no HSM  Extremities: No c/c/e  Neurologic:  CN 2-12 gi, Alert and oriented X 3.  No acute neurological distress   Psych:   Good insight. Not anxious nor agitated.           Assessment:   I have reviewed with the patient +/- family alternatives,benefits and risks for the procedure, as well as potential complications(with

## 2025-02-05 NOTE — ANESTHESIA PRE PROCEDURE
Department of Anesthesiology  Preprocedure Note       Name:  Bárbara Barrera   Age:  44 y.o.  :  1980                                          MRN:  [id number]         Date:  2025      Surgeon: Surgeon(s):  Dalton Guido MD    Procedure: Procedure(s):  ESOPHAGOGASTRODUODENOSCOPY BIOPSY  COLORECTAL CANCER SCREENING, HIGH RISK    Medications prior to admission:   Prior to Admission medications    Medication Sig Start Date End Date Taking? Authorizing Provider   Multiple Vitamins-Minerals (MULTIVITAMIN ADULTS PO) Take by mouth   Yes Virginie Shrestha MD   Prenatal Vit-DSS-Fe Cbn-FA (PRENATAL AD PO) Take by mouth   Yes Virginie Srhestha MD   Collagen-Vitamin C-Biotin 500-50-0.8 MG CAPS Take by mouth   Yes Virginie Shrestha MD       Current medications:    No current facility-administered medications for this encounter.       Allergies:  No Known Allergies    Problem List:    Patient Active Problem List   Diagnosis Code   • Depression F32.A   • Elevated cholesterol E78.00   • Sciatica M54.30   • Menstrual irregularity N92.6   • Vertigo R42   • Prediabetes R73.03   • Hypovitaminosis D E55.9   • Pregnancy Z34.90   • Streptococcus B carrier state complicating childbirth O99.824   • 39 weeks gestation of pregnancy Z3A.39   • Maternal care due to low transverse uterine scar from previous  delivery O34.211       Past Medical History:        Diagnosis Date   • Psychiatric problem        Past Surgical History:        Procedure Laterality Date   • APPENDECTOMY     •  SECTION     • CHOLECYSTECTOMY  2024       Social History:    Social History     Tobacco Use   • Smoking status: Never   • Smokeless tobacco: Never   Substance Use Topics   • Alcohol use: No                                Counseling given: Not Answered      Vital Signs (Current):   Vitals:    25 0815   BP: (!) 147/121   Pulse: 84   Resp: 15   Temp: 98.2 °F (36.8 °C)   TempSrc: Tympanic   SpO2: 98%

## 2025-02-05 NOTE — OP NOTE
[facility] [address] [address]                Endoscopic Gastroduodenoscopy Procedure Note    Bárbara HOOPER [facility]  1980  [id number]    Indication: Abdominal pain epigastric, dyspepsia    : Dalton Guido MD    Assistants: None    Referring Provider:  Adela Negrete, APRN - CNP    Anesthesia/Sedation:  MAC anesthesia Propofol      Airway assessment: No airway problems anticipated    Pre-Procedural Exam:  Airway: clear, no airway problems anticipated  Heart: RRR, without gallops or rubs  Lungs: clear bilaterally without wheezes, crackles, or rhonchi  Abdomen: soft, nontender, nondistended, bowel sounds present  Mental Status: awake, alert and oriented to person, place and time        Procedure Details   After infomed consent was obtained for the procedure, with all risks and benefits of procedure explained the patient was taken to the endoscopy suite and placed in the left lateral decubitus position.  Following sequential administration of sedation as per above, the endoscope was inserted into the mouth and advanced under direct vision to second portion of the duodenum.  A careful inspection was made as the gastroscope was withdrawn, including a retroflexed view of the proximal stomach; findings and interventions are described below.      Findings:   Esophagus:normal  Stomach:   Small sliding hiatal hernia  normal biopsies taken for histology in the antrum  Duodenum/jejunum: normal random biopsies taken for histology    Therapies:  biopsy of stomach antrum    Specimens:  As above    Implants: None           Complications:   None; patient tolerated the procedure well.    EBL:  None.           Impression:   Small sliding hiatal hernia    Recommendations:  -Continue acid suppression.,   -Await pathology.,   -Follow symptoms.,   -GERD diet: avoid fried and fatty foods. peppermint, chocolate, alcohol, coffee, citrus fruits and juices, tomoato products; avoid lying down

## 2025-02-05 NOTE — PROGRESS NOTES
Initial RN admission and assessment performed and documented in Endoscopy navigator.     Patient evaluated by anesthesia in pre-procedure holding.     All procedural vital signs, airway assessment, and level of consciousness information monitored and recorded by anesthesia staff on the anesthesia record.     Report received from CRNA post procedure.  Patient transported to recovery area by RN.    Endoscopy post procedure time out was performed and specimens were verified with physician.    Endoscope was pre-cleaned at bedside immediately following procedure by KARLO NUÑEZ

## 2025-02-05 NOTE — ANESTHESIA POSTPROCEDURE EVALUATION
Department of Anesthesiology  Postprocedure Note    Patient: Bárbara Barrera  MRN: [id number]  YOB: 1980  Date of evaluation: 2/5/2025    Procedure Summary       Date: 02/05/25 Room / Location: [facility] ENDO 04 / [facility] ENDOSCOPY    Anesthesia Start: 0827 Anesthesia Stop: 0849    Procedures:       ESOPHAGOGASTRODUODENOSCOPY BIOPSY      COLORECTAL CANCER SCREENING, HIGH RISK Diagnosis:       Gastroesophageal reflux disease, unspecified whether esophagitis present      Family history of colon cancer      (Gastroesophageal reflux disease, unspecified whether esophagitis present [K21.9])      (Family history of colon cancer [Z80.0])    Surgeons: Dalton Guido MD Responsible Provider: Harpreet Price MD    Anesthesia Type: MAC ASA Status: 1            Anesthesia Type: MAC    [place] Phase I: [place] Score: 10    [place] Phase II: [place] Score: 9    Anesthesia Post Evaluation    Patient location during evaluation: bedside  Nausea & Vomiting: no nausea  Cardiovascular status: blood pressure returned to baseline  Respiratory status: acceptable  Hydration status: euvolemic    No notable events documented.

## 2025-02-05 NOTE — DISCHARGE INSTRUCTIONS
[facility]  [address] [place] [id number]                   Bárbara Barrera  [id number]  1980    COLON DISCHARGE INSTRUCTIONS    DISCOMFORT:  Redness at IV site- apply warm compress to area; if redness or soreness persist- contact your physician  There may be a slight amount of blood passed from the rectum  Gaseous discomfort- walking, belching will help relieve any discomfort  You may not operate a vehicle for 12 hours  You may not  engage in an occupation involving machinery or appliances for rest of today  You may not  drink alcoholic beverages for at least 12 hours  Avoid making any critical decisions for at least 24 hour    DIET:   High fiber diet.   - however -  remember your colon is empty and a heavy meal will produce gas.   Avoid these foods:  vegetables, fried / greasy foods, carbonated drinks for today     ACTIVITY:  It is recommended that you spend the remainder of the day resting -  avoid any strenuous activity.  CALL M.D.  ANY SIGN OF:   Increasing pain, nausea, vomiting  Abdominal distension (swelling)  New increased bleeding (oral or rectal)  Fever (chills)  Pain in chest area  Bloody discharge from nose or mouth  Shortness of breath    You may resume previous medications    Post procedure diagnosis:  no lesion found. Small internal hemorrhoids seen    Follow-up Instructions:    If a specimen was collected, you will receive a letter with the result by mail within two  weeks. Depending on the result this letter will specify your follow up colonoscopy date.      Please call us for any questions or concerns                     Bárbara Barrera  [id number]  1980        DISCHARGE SUMMARY from Nurse    The following personal items collected during your admission are returned to you:   Dental Appliance:    Vision:    Hearing Aid:    Jewelry:    Clothing:    Other Valuables:    Valuables sent to safe: Dose (mL/hr) Propofol : *25 mg

## 2025-02-05 NOTE — OP NOTE
[facility]  [address], [place] [address]                   Colonoscopy Operative Report      Indications:    Family history of coloretal cancer (screening only), constipation      :  Dalton Guido MD    Assistants: None    Referring Provider: Adela Negrete APRN - CNP    Sedation:  MAC anesthesia Propofol    Procedure Details:  After informed consent was obtained with all risks and benefits of procedure explained and preoperative exam completed, the patient was taken to the endoscopy suite and placed in the left lateral decubitus position.  Upon sequential sedation as per above, a digital rectal exam was performed  And was normal.  The Olympus videocolonoscope  was inserted in the rectum and carefully advanced to the cecum, which was identified by the ileocecal valve and appendiceal orifice, terminal ileum.  The quality of preparation was excellent.  The colonoscope was slowly withdrawn with careful evaluation between folds. Retroflexion in the rectum was performed and was normal..     Findings:   Rectum: Grade 1 internal hemorrhoid(s);  Sigmoid: normal  Descending Colon: normal  Transverse Colon: normal  Ascending Colon: normal  Cecum: normal  Terminal Ileum: normal    Interventions:  no mucosal lesion appreciated    Specimen Removed:  none    Implants: None    Complications: None.     EBL:  None.    Impression: Small internal hemorrhoid otherwise normal colonoscopy    Recommendations:   -Repeat colonoscopy in 3 years.  -High fiber diet.     -Resume normal medication(s)  -Call office for for any questions concern  -Constipation education  -Follow-up as scheduled      Discharge Disposition:  Home in the company of a  when able to ambulate.    Dalton Guido MD  2/5/2025  8:58 AM

## 2025-02-06 ENCOUNTER — COMMUNITY OUTREACH (OUTPATIENT)
Facility: CLINIC | Age: 45
End: 2025-02-06

## (undated) DEVICE — ORISE PROKNIFE 1.5 MM ELECTRODE: Brand: ORISE™ PROKNIFE

## (undated) DEVICE — SUPPLEMENT DIGESTIVE H2O SOL GI-EASE

## (undated) DEVICE — ORISE PROKNIFE 3.0 MM ELECTRODE: Brand: ORISE™ PROKNIFE

## (undated) DEVICE — FORCEPS BX L240CM JAW DIA2.4MM ORNG L CAP W/ NDL DISP RAD